# Patient Record
Sex: MALE | Race: WHITE | Employment: FULL TIME | URBAN - METROPOLITAN AREA
[De-identification: names, ages, dates, MRNs, and addresses within clinical notes are randomized per-mention and may not be internally consistent; named-entity substitution may affect disease eponyms.]

---

## 2019-08-28 ENCOUNTER — HOSPITAL ENCOUNTER (INPATIENT)
Age: 29
LOS: 3 days | Discharge: HOME | DRG: 638 | End: 2019-08-31
Attending: INTERNAL MEDICINE | Admitting: INTERNAL MEDICINE
Payer: MEDICAID

## 2019-08-28 ENCOUNTER — APPOINTMENT (OUTPATIENT)
Dept: GENERAL RADIOLOGY | Age: 29
End: 2019-08-28

## 2019-08-28 ENCOUNTER — HOSPITAL ENCOUNTER (OUTPATIENT)
Age: 29
Discharge: HOME | End: 2019-08-28

## 2019-08-28 ENCOUNTER — HOSPITAL ENCOUNTER (EMERGENCY)
Age: 29
Discharge: ANOTHER HOSPITAL | End: 2019-08-28
Attending: EMERGENCY MEDICINE | Admitting: INTERNAL MEDICINE

## 2019-08-28 VITALS
HEART RATE: 96 BPM | SYSTOLIC BLOOD PRESSURE: 121 MMHG | DIASTOLIC BLOOD PRESSURE: 66 MMHG | RESPIRATION RATE: 14 BRPM | BODY MASS INDEX: 18.3 KG/M2 | WEIGHT: 130.7 LBS | TEMPERATURE: 97.4 F | OXYGEN SATURATION: 100 % | HEIGHT: 71 IN

## 2019-08-28 DIAGNOSIS — E13.10 DIABETIC KETOACIDOSIS WITHOUT COMA ASSOCIATED WITH OTHER SPECIFIED DIABETES MELLITUS (HCC): Primary | ICD-10-CM

## 2019-08-28 DIAGNOSIS — E03.9 HYPOTHYROIDISM, UNSPECIFIED TYPE: ICD-10-CM

## 2019-08-28 PROBLEM — E87.5 HYPERKALEMIA: Status: ACTIVE | Noted: 2019-08-28

## 2019-08-28 PROBLEM — D72.829 LEUKOCYTOSIS: Status: ACTIVE | Noted: 2019-08-28

## 2019-08-28 PROBLEM — Z72.0 TOBACCO ABUSE: Status: ACTIVE | Noted: 2019-08-28

## 2019-08-28 PROBLEM — E10.10 DKA, TYPE 1, NOT AT GOAL (HCC): Status: ACTIVE | Noted: 2019-08-28

## 2019-08-28 PROBLEM — E03.8 SUBCLINICAL HYPOTHYROIDISM: Status: ACTIVE | Noted: 2019-08-28

## 2019-08-28 LAB
ALBUMIN SERPL-MCNC: 3.8 G/DL (ref 3.5–5.2)
ALP BLD-CCNC: 227 U/L (ref 40–129)
ALT SERPL-CCNC: 20 U/L (ref 0–40)
ANION GAP SERPL CALCULATED.3IONS-SCNC: 11 MMOL/L (ref 7–16)
ANION GAP SERPL CALCULATED.3IONS-SCNC: 19 MMOL/L (ref 7–16)
ANION GAP SERPL CALCULATED.3IONS-SCNC: 23 MMOL/L (ref 7–16)
ANION GAP SERPL CALCULATED.3IONS-SCNC: 24 MMOL/L (ref 7–16)
AST SERPL-CCNC: 16 U/L (ref 0–39)
BASOPHILS ABSOLUTE: 0 E9/L (ref 0–0.2)
BASOPHILS RELATIVE PERCENT: 0 % (ref 0–2)
BILIRUB SERPL-MCNC: 0.2 MG/DL (ref 0–1.2)
BILIRUBIN URINE: NEGATIVE
BLOOD, URINE: NEGATIVE
BUN BLDV-MCNC: 15 MG/DL (ref 6–20)
BUN BLDV-MCNC: 20 MG/DL (ref 6–20)
BUN BLDV-MCNC: 23 MG/DL (ref 6–20)
BUN BLDV-MCNC: 26 MG/DL (ref 6–20)
CALCIUM SERPL-MCNC: 10.1 MG/DL (ref 8.6–10.2)
CALCIUM SERPL-MCNC: 8.3 MG/DL (ref 8.6–10.2)
CALCIUM SERPL-MCNC: 8.8 MG/DL (ref 8.6–10.2)
CALCIUM SERPL-MCNC: 9.5 MG/DL (ref 8.6–10.2)
CHLORIDE BLD-SCNC: 72 MMOL/L (ref 98–107)
CHLORIDE BLD-SCNC: 84 MMOL/L (ref 98–107)
CHLORIDE BLD-SCNC: 91 MMOL/L (ref 98–107)
CHLORIDE BLD-SCNC: 97 MMOL/L (ref 98–107)
CLARITY: CLEAR
CO2: 20 MMOL/L (ref 22–29)
CO2: 21 MMOL/L (ref 22–29)
CO2: 22 MMOL/L (ref 22–29)
CO2: 26 MMOL/L (ref 22–29)
COLOR: YELLOW
CREAT SERPL-MCNC: 0.6 MG/DL (ref 0.7–1.2)
CREAT SERPL-MCNC: 0.7 MG/DL (ref 0.7–1.2)
CREAT SERPL-MCNC: 0.7 MG/DL (ref 0.7–1.2)
CREAT SERPL-MCNC: 0.8 MG/DL (ref 0.7–1.2)
EOSINOPHILS ABSOLUTE: 0.51 E9/L (ref 0.05–0.5)
EOSINOPHILS RELATIVE PERCENT: 4 % (ref 0–6)
GFR AFRICAN AMERICAN: >60
GFR NON-AFRICAN AMERICAN: >60 ML/MIN/1.73
GLUCOSE BLD-MCNC: 1109 MG/DL (ref 74–99)
GLUCOSE BLD-MCNC: 207 MG/DL (ref 74–99)
GLUCOSE BLD-MCNC: 370 MG/DL (ref 74–99)
GLUCOSE BLD-MCNC: 563 MG/DL (ref 74–99)
GLUCOSE URINE: >=1000 MG/DL
HCT VFR BLD CALC: 45.6 % (ref 37–54)
HEMOGLOBIN: 15.8 G/DL (ref 12.5–16.5)
KETONES, URINE: >=80 MG/DL
LACTIC ACID, SEPSIS: 1.3 MMOL/L (ref 0.5–1.9)
LACTIC ACID, SEPSIS: 1.5 MMOL/L (ref 0.5–1.9)
LEUKOCYTE ESTERASE, URINE: NEGATIVE
LIPASE: 29 U/L (ref 13–60)
LYMPHOCYTES ABSOLUTE: 1.41 E9/L (ref 1.5–4)
LYMPHOCYTES RELATIVE PERCENT: 11 % (ref 20–42)
MAGNESIUM: 1.7 MG/DL (ref 1.6–2.6)
MAGNESIUM: 1.9 MG/DL (ref 1.6–2.6)
MAGNESIUM: 2.1 MG/DL (ref 1.6–2.6)
MCH RBC QN AUTO: 32.8 PG (ref 26–35)
MCHC RBC AUTO-ENTMCNC: 34.6 % (ref 32–34.5)
MCV RBC AUTO: 94.8 FL (ref 80–99.9)
METER GLUCOSE: 212 MG/DL (ref 74–99)
METER GLUCOSE: 233 MG/DL (ref 74–99)
METER GLUCOSE: 266 MG/DL (ref 74–99)
METER GLUCOSE: 289 MG/DL (ref 74–99)
METER GLUCOSE: >500 MG/DL (ref 74–99)
METER GLUCOSE: >500 MG/DL (ref 74–99)
MONO TEST: NEGATIVE
MONOCYTES ABSOLUTE: 0.9 E9/L (ref 0.1–0.95)
MONOCYTES RELATIVE PERCENT: 7 % (ref 2–12)
NEUTROPHILS ABSOLUTE: 9.98 E9/L (ref 1.8–7.3)
NEUTROPHILS RELATIVE PERCENT: 78 % (ref 43–80)
NITRITE, URINE: NEGATIVE
PDW BLD-RTO: 12.4 FL (ref 11.5–15)
PH UA: 5 (ref 5–9)
PHOSPHORUS: 2.6 MG/DL (ref 2.5–4.5)
PHOSPHORUS: 2.6 MG/DL (ref 2.5–4.5)
PHOSPHORUS: 3.6 MG/DL (ref 2.5–4.5)
PLATELET # BLD: 312 E9/L (ref 130–450)
PMV BLD AUTO: 10.6 FL (ref 7–12)
POTASSIUM SERPL-SCNC: 3.5 MMOL/L (ref 3.5–5)
POTASSIUM SERPL-SCNC: 3.6 MMOL/L (ref 3.5–5)
POTASSIUM SERPL-SCNC: 4.9 MMOL/L (ref 3.5–5)
POTASSIUM SERPL-SCNC: 5.7 MMOL/L (ref 3.5–5)
PROTEIN UA: NEGATIVE MG/DL
RBC # BLD: 4.81 E12/L (ref 3.8–5.8)
RBC # BLD: NORMAL 10*6/UL
SODIUM BLD-SCNC: 116 MMOL/L (ref 132–146)
SODIUM BLD-SCNC: 128 MMOL/L (ref 132–146)
SODIUM BLD-SCNC: 132 MMOL/L (ref 132–146)
SODIUM BLD-SCNC: 134 MMOL/L (ref 132–146)
SPECIFIC GRAVITY UA: <=1.005 (ref 1–1.03)
T4 FREE: 0.67 NG/DL (ref 0.93–1.7)
TOTAL PROTEIN: 7.7 G/DL (ref 6.4–8.3)
TSH SERPL DL<=0.05 MIU/L-ACNC: 4.42 UIU/ML (ref 0.27–4.2)
UROBILINOGEN, URINE: 0.2 E.U./DL
WBC # BLD: 12.8 E9/L (ref 4.5–11.5)

## 2019-08-28 PROCEDURE — 2580000003 HC RX 258: Performed by: INTERNAL MEDICINE

## 2019-08-28 PROCEDURE — 82962 GLUCOSE BLOOD TEST: CPT

## 2019-08-28 PROCEDURE — 86308 HETEROPHILE ANTIBODY SCREEN: CPT

## 2019-08-28 PROCEDURE — 93005 ELECTROCARDIOGRAM TRACING: CPT

## 2019-08-28 PROCEDURE — 80053 COMPREHEN METABOLIC PANEL: CPT

## 2019-08-28 PROCEDURE — A0426 ALS 1: HCPCS

## 2019-08-28 PROCEDURE — 83735 ASSAY OF MAGNESIUM: CPT

## 2019-08-28 PROCEDURE — 81003 URINALYSIS AUTO W/O SCOPE: CPT

## 2019-08-28 PROCEDURE — 6360000002 HC RX W HCPCS: Performed by: INTERNAL MEDICINE

## 2019-08-28 PROCEDURE — 2000000000 HC ICU R&B

## 2019-08-28 PROCEDURE — 85025 COMPLETE CBC W/AUTO DIFF WBC: CPT

## 2019-08-28 PROCEDURE — 2580000003 HC RX 258: Performed by: EMERGENCY MEDICINE

## 2019-08-28 PROCEDURE — 87081 CULTURE SCREEN ONLY: CPT

## 2019-08-28 PROCEDURE — 2500000003 HC RX 250 WO HCPCS: Performed by: INTERNAL MEDICINE

## 2019-08-28 PROCEDURE — 80048 BASIC METABOLIC PNL TOTAL CA: CPT

## 2019-08-28 PROCEDURE — 99285 EMERGENCY DEPT VISIT HI MDM: CPT

## 2019-08-28 PROCEDURE — A0425 GROUND MILEAGE: HCPCS

## 2019-08-28 PROCEDURE — 84443 ASSAY THYROID STIM HORMONE: CPT

## 2019-08-28 PROCEDURE — 6370000000 HC RX 637 (ALT 250 FOR IP): Performed by: EMERGENCY MEDICINE

## 2019-08-28 PROCEDURE — 84100 ASSAY OF PHOSPHORUS: CPT

## 2019-08-28 PROCEDURE — 36415 COLL VENOUS BLD VENIPUNCTURE: CPT

## 2019-08-28 PROCEDURE — 84439 ASSAY OF FREE THYROXINE: CPT

## 2019-08-28 PROCEDURE — 71046 X-RAY EXAM CHEST 2 VIEWS: CPT

## 2019-08-28 PROCEDURE — 83605 ASSAY OF LACTIC ACID: CPT

## 2019-08-28 PROCEDURE — 1200000000 HC SEMI PRIVATE

## 2019-08-28 PROCEDURE — 83690 ASSAY OF LIPASE: CPT

## 2019-08-28 PROCEDURE — 6370000000 HC RX 637 (ALT 250 FOR IP): Performed by: INTERNAL MEDICINE

## 2019-08-28 RX ORDER — 0.9 % SODIUM CHLORIDE 0.9 %
15 INTRAVENOUS SOLUTION INTRAVENOUS ONCE
Status: CANCELLED | OUTPATIENT
Start: 2019-08-28 | End: 2019-08-28

## 2019-08-28 RX ORDER — 0.9 % SODIUM CHLORIDE 0.9 %
15 INTRAVENOUS SOLUTION INTRAVENOUS ONCE
Status: COMPLETED | OUTPATIENT
Start: 2019-08-28 | End: 2019-08-28

## 2019-08-28 RX ORDER — SODIUM CHLORIDE 9 MG/ML
INJECTION, SOLUTION INTRAVENOUS CONTINUOUS
Status: DISCONTINUED | OUTPATIENT
Start: 2019-08-28 | End: 2019-08-28 | Stop reason: HOSPADM

## 2019-08-28 RX ORDER — DEXTROSE MONOHYDRATE 25 G/50ML
12.5 INJECTION, SOLUTION INTRAVENOUS PRN
Status: CANCELLED | OUTPATIENT
Start: 2019-08-28

## 2019-08-28 RX ORDER — SODIUM CHLORIDE 9 MG/ML
INJECTION, SOLUTION INTRAVENOUS CONTINUOUS
Status: CANCELLED | OUTPATIENT
Start: 2019-08-28

## 2019-08-28 RX ORDER — POTASSIUM CHLORIDE 7.45 MG/ML
10 INJECTION INTRAVENOUS PRN
Status: CANCELLED | OUTPATIENT
Start: 2019-08-28

## 2019-08-28 RX ORDER — DEXTROSE, SODIUM CHLORIDE, AND POTASSIUM CHLORIDE 5; .45; .15 G/100ML; G/100ML; G/100ML
INJECTION INTRAVENOUS CONTINUOUS PRN
Status: DISCONTINUED | OUTPATIENT
Start: 2019-08-28 | End: 2019-08-29

## 2019-08-28 RX ORDER — ONDANSETRON 2 MG/ML
4 INJECTION INTRAMUSCULAR; INTRAVENOUS EVERY 6 HOURS PRN
Status: DISCONTINUED | OUTPATIENT
Start: 2019-08-28 | End: 2019-08-31 | Stop reason: HOSPADM

## 2019-08-28 RX ORDER — ACETAMINOPHEN 325 MG/1
650 TABLET ORAL EVERY 4 HOURS PRN
Status: DISCONTINUED | OUTPATIENT
Start: 2019-08-28 | End: 2019-08-31 | Stop reason: HOSPADM

## 2019-08-28 RX ORDER — SODIUM CHLORIDE 9 MG/ML
INJECTION, SOLUTION INTRAVENOUS CONTINUOUS
Status: DISCONTINUED | OUTPATIENT
Start: 2019-08-28 | End: 2019-08-29

## 2019-08-28 RX ORDER — DEXTROSE MONOHYDRATE 25 G/50ML
12.5 INJECTION, SOLUTION INTRAVENOUS PRN
Status: DISCONTINUED | OUTPATIENT
Start: 2019-08-28 | End: 2019-08-28 | Stop reason: HOSPADM

## 2019-08-28 RX ORDER — DEXTROSE, SODIUM CHLORIDE, AND POTASSIUM CHLORIDE 5; .45; .15 G/100ML; G/100ML; G/100ML
INJECTION INTRAVENOUS CONTINUOUS PRN
Status: DISCONTINUED | OUTPATIENT
Start: 2019-08-28 | End: 2019-08-28 | Stop reason: HOSPADM

## 2019-08-28 RX ORDER — MAGNESIUM SULFATE 1 G/100ML
1 INJECTION INTRAVENOUS PRN
Status: CANCELLED | OUTPATIENT
Start: 2019-08-28

## 2019-08-28 RX ORDER — DEXTROSE MONOHYDRATE 25 G/50ML
12.5 INJECTION, SOLUTION INTRAVENOUS PRN
Status: DISCONTINUED | OUTPATIENT
Start: 2019-08-28 | End: 2019-08-31 | Stop reason: HOSPADM

## 2019-08-28 RX ORDER — DEXTROSE AND SODIUM CHLORIDE 5; .45 G/100ML; G/100ML
INJECTION, SOLUTION INTRAVENOUS CONTINUOUS PRN
Status: CANCELLED | OUTPATIENT
Start: 2019-08-28

## 2019-08-28 RX ORDER — POTASSIUM CHLORIDE 7.45 MG/ML
10 INJECTION INTRAVENOUS PRN
Status: DISCONTINUED | OUTPATIENT
Start: 2019-08-28 | End: 2019-08-28 | Stop reason: HOSPADM

## 2019-08-28 RX ORDER — POTASSIUM CHLORIDE 7.45 MG/ML
10 INJECTION INTRAVENOUS PRN
Status: DISCONTINUED | OUTPATIENT
Start: 2019-08-28 | End: 2019-08-29

## 2019-08-28 RX ORDER — MAGNESIUM SULFATE 1 G/100ML
1 INJECTION INTRAVENOUS PRN
Status: DISCONTINUED | OUTPATIENT
Start: 2019-08-28 | End: 2019-08-29

## 2019-08-28 RX ORDER — MAGNESIUM SULFATE 1 G/100ML
1 INJECTION INTRAVENOUS PRN
Status: DISCONTINUED | OUTPATIENT
Start: 2019-08-28 | End: 2019-08-28 | Stop reason: HOSPADM

## 2019-08-28 RX ADMIN — SODIUM CHLORIDE 0.05 UNITS/KG/HR: 9 INJECTION, SOLUTION INTRAVENOUS at 17:11

## 2019-08-28 RX ADMIN — SODIUM CHLORIDE: 9 INJECTION, SOLUTION INTRAVENOUS at 18:48

## 2019-08-28 RX ADMIN — POTASSIUM CHLORIDE 10 MEQ: 10 INJECTION, SOLUTION INTRAVENOUS at 19:20

## 2019-08-28 RX ADMIN — POTASSIUM CHLORIDE 10 MEQ: 10 INJECTION, SOLUTION INTRAVENOUS at 20:32

## 2019-08-28 RX ADMIN — POTASSIUM CHLORIDE 10 MEQ: 10 INJECTION, SOLUTION INTRAVENOUS at 21:35

## 2019-08-28 RX ADMIN — SODIUM CHLORIDE: 9 INJECTION, SOLUTION INTRAVENOUS at 18:18

## 2019-08-28 RX ADMIN — POTASSIUM CHLORIDE, DEXTROSE MONOHYDRATE AND SODIUM CHLORIDE: 150; 5; 450 INJECTION, SOLUTION INTRAVENOUS at 21:47

## 2019-08-28 RX ADMIN — ACETAMINOPHEN 650 MG: 325 TABLET ORAL at 20:39

## 2019-08-28 RX ADMIN — SODIUM CHLORIDE 0.1 UNITS/KG/HR: 9 INJECTION, SOLUTION INTRAVENOUS at 14:40

## 2019-08-28 RX ADMIN — SODIUM PHOSPHATE, MONOBASIC, MONOHYDRATE 10 MMOL: 276; 142 INJECTION, SOLUTION INTRAVENOUS at 20:42

## 2019-08-28 RX ADMIN — SODIUM CHLORIDE: 9 INJECTION, SOLUTION INTRAVENOUS at 14:43

## 2019-08-28 RX ADMIN — SODIUM CHLORIDE 890 ML: 9 INJECTION, SOLUTION INTRAVENOUS at 14:44

## 2019-08-28 ASSESSMENT — PAIN DESCRIPTION - LOCATION
LOCATION: MOUTH
LOCATION: MOUTH

## 2019-08-28 ASSESSMENT — PAIN SCALES - GENERAL
PAINLEVEL_OUTOF10: 0
PAINLEVEL_OUTOF10: 3
PAINLEVEL_OUTOF10: 3
PAINLEVEL_OUTOF10: 0

## 2019-08-28 ASSESSMENT — PAIN SCALES - WONG BAKER: WONGBAKER_NUMERICALRESPONSE: 8

## 2019-08-28 ASSESSMENT — PAIN DESCRIPTION - PAIN TYPE
TYPE: CHRONIC PAIN
TYPE: ACUTE PAIN

## 2019-08-28 NOTE — ED PROVIDER NOTES
meningeal signs  Respiratory: Lungs clear to auscultation bilaterally, no wheezes, rales, or rhonchi. Not in respiratory distress  Cardiovascular:  Tachycardic but Regular rhythm. No murmurs, no aortic murmurs, no gallops, or rubs. 2+ distal pulses. Equal extremity pulses. Chest: No chest wall tenderness  GI:  Abdomen Soft, Non tender, Non distended. +BS. No rebound, guarding, or rigidity. No pulsatile masses. Musculoskeletal: Moves all extremities x 4. Warm and well perfused, no clubbing, cyanosis, or edema. Capillary refill <3 seconds  Integument: skin warm and dry. No rashes. Neurologic: GCS 15, no focal deficits,         -------------------------------------------------- RESULTS -------------------------------------------------  I have personally reviewed all laboratory and imaging results for this patient. Results are listed below.      LABS:  Results for orders placed or performed during the hospital encounter of 08/28/19   CBC Auto Differential   Result Value Ref Range    WBC 12.8 (H) 4.5 - 11.5 E9/L    RBC 4.81 3.80 - 5.80 E12/L    Hemoglobin 15.8 12.5 - 16.5 g/dL    Hematocrit 45.6 37.0 - 54.0 %    MCV 94.8 80.0 - 99.9 fL    MCH 32.8 26.0 - 35.0 pg    MCHC 34.6 (H) 32.0 - 34.5 %    RDW 12.4 11.5 - 15.0 fL    Platelets 761 880 - 733 E9/L    MPV 10.6 7.0 - 12.0 fL    Neutrophils % 78.0 43.0 - 80.0 %    Lymphocytes % 11.0 (L) 20.0 - 42.0 %    Monocytes % 7.0 2.0 - 12.0 %    Eosinophils % 4.0 0.0 - 6.0 %    Basophils % 0.0 0.0 - 2.0 %    Neutrophils Absolute 9.98 (H) 1.80 - 7.30 E9/L    Lymphocytes Absolute 1.41 (L) 1.50 - 4.00 E9/L    Monocytes Absolute 0.90 0.10 - 0.95 E9/L    Eosinophils Absolute 0.51 (H) 0.05 - 0.50 E9/L    Basophils Absolute 0.00 0.00 - 0.20 E9/L    RBC Morphology Normal    Comprehensive Metabolic Panel   Result Value Ref Range    Sodium 116 (LL) 132 - 146 mmol/L    Potassium 5.7 (H) 3.5 - 5.0 mmol/L    Chloride 72 (LL) 98 - 107 mmol/L    CO2 20 (L) 22 - 29 mmol/L    Anion Gap 24

## 2019-08-29 LAB
AMORPHOUS: ABNORMAL
AMPHETAMINE SCREEN, URINE: NOT DETECTED
ANION GAP SERPL CALCULATED.3IONS-SCNC: 9 MMOL/L (ref 7–16)
ANION GAP SERPL CALCULATED.3IONS-SCNC: 9 MMOL/L (ref 7–16)
BACTERIA: ABNORMAL /HPF
BARBITURATE SCREEN URINE: NOT DETECTED
BENZODIAZEPINE SCREEN, URINE: NOT DETECTED
BILIRUBIN URINE: ABNORMAL
BLOOD, URINE: NEGATIVE
BUN BLDV-MCNC: 12 MG/DL (ref 6–20)
BUN BLDV-MCNC: 13 MG/DL (ref 6–20)
CALCIUM SERPL-MCNC: 7.8 MG/DL (ref 8.6–10.2)
CALCIUM SERPL-MCNC: 8.4 MG/DL (ref 8.6–10.2)
CANNABINOID SCREEN URINE: NOT DETECTED
CASTS: ABNORMAL /LPF
CHLORIDE BLD-SCNC: 101 MMOL/L (ref 98–107)
CHLORIDE BLD-SCNC: 97 MMOL/L (ref 98–107)
CLARITY: CLEAR
CO2: 24 MMOL/L (ref 22–29)
CO2: 27 MMOL/L (ref 22–29)
COCAINE METABOLITE SCREEN URINE: NOT DETECTED
COLOR: YELLOW
CREAT SERPL-MCNC: 0.6 MG/DL (ref 0.7–1.2)
CREAT SERPL-MCNC: 0.6 MG/DL (ref 0.7–1.2)
CRYSTALS, UA: ABNORMAL
EPITHELIAL CELLS, UA: ABNORMAL /HPF
GFR AFRICAN AMERICAN: >60
GFR AFRICAN AMERICAN: >60
GFR NON-AFRICAN AMERICAN: >60 ML/MIN/1.73
GFR NON-AFRICAN AMERICAN: >60 ML/MIN/1.73
GLUCOSE BLD-MCNC: 128 MG/DL (ref 74–99)
GLUCOSE BLD-MCNC: 194 MG/DL (ref 74–99)
GLUCOSE URINE: 500 MG/DL
HCT VFR BLD CALC: 38 % (ref 37–54)
HEMOGLOBIN: 13.3 G/DL (ref 12.5–16.5)
KETONES, URINE: 40 MG/DL
LEUKOCYTE ESTERASE, URINE: NEGATIVE
Lab: NORMAL
MAGNESIUM: 1.6 MG/DL (ref 1.6–2.6)
MAGNESIUM: 1.7 MG/DL (ref 1.6–2.6)
MCH RBC QN AUTO: 32.4 PG (ref 26–35)
MCHC RBC AUTO-ENTMCNC: 35 % (ref 32–34.5)
MCV RBC AUTO: 92.5 FL (ref 80–99.9)
METER GLUCOSE: 125 MG/DL (ref 74–99)
METER GLUCOSE: 137 MG/DL (ref 74–99)
METER GLUCOSE: 137 MG/DL (ref 74–99)
METER GLUCOSE: 139 MG/DL (ref 74–99)
METER GLUCOSE: 152 MG/DL (ref 74–99)
METER GLUCOSE: 171 MG/DL (ref 74–99)
METER GLUCOSE: 184 MG/DL (ref 74–99)
METER GLUCOSE: 198 MG/DL (ref 74–99)
METER GLUCOSE: 199 MG/DL (ref 74–99)
METER GLUCOSE: 205 MG/DL (ref 74–99)
METER GLUCOSE: 213 MG/DL (ref 74–99)
METER GLUCOSE: 315 MG/DL (ref 74–99)
METER GLUCOSE: 352 MG/DL (ref 74–99)
METHADONE SCREEN, URINE: NOT DETECTED
NITRITE, URINE: NEGATIVE
OPIATE SCREEN URINE: NOT DETECTED
PDW BLD-RTO: 12.5 FL (ref 11.5–15)
PH UA: 5.5 (ref 5–9)
PHENCYCLIDINE SCREEN URINE: NOT DETECTED
PHOSPHORUS: 2.6 MG/DL (ref 2.5–4.5)
PHOSPHORUS: 2.9 MG/DL (ref 2.5–4.5)
PLATELET # BLD: 223 E9/L (ref 130–450)
PMV BLD AUTO: 10 FL (ref 7–12)
POTASSIUM SERPL-SCNC: 3.8 MMOL/L (ref 3.5–5)
POTASSIUM SERPL-SCNC: 4.5 MMOL/L (ref 3.5–5)
PROPOXYPHENE SCREEN: NOT DETECTED
PROTEIN UA: 30 MG/DL
RBC # BLD: 4.11 E12/L (ref 3.8–5.8)
RBC UA: ABNORMAL /HPF (ref 0–2)
SODIUM BLD-SCNC: 133 MMOL/L (ref 132–146)
SODIUM BLD-SCNC: 134 MMOL/L (ref 132–146)
SPECIFIC GRAVITY UA: >=1.03 (ref 1–1.03)
UROBILINOGEN, URINE: 0.2 E.U./DL
WBC # BLD: 10.6 E9/L (ref 4.5–11.5)
WBC UA: ABNORMAL /HPF (ref 0–5)

## 2019-08-29 PROCEDURE — 80307 DRUG TEST PRSMV CHEM ANLYZR: CPT

## 2019-08-29 PROCEDURE — 84681 ASSAY OF C-PEPTIDE: CPT

## 2019-08-29 PROCEDURE — 87088 URINE BACTERIA CULTURE: CPT

## 2019-08-29 PROCEDURE — 2500000003 HC RX 250 WO HCPCS: Performed by: INTERNAL MEDICINE

## 2019-08-29 PROCEDURE — 80048 BASIC METABOLIC PNL TOTAL CA: CPT

## 2019-08-29 PROCEDURE — 82962 GLUCOSE BLOOD TEST: CPT

## 2019-08-29 PROCEDURE — 2580000003 HC RX 258: Performed by: INTERNAL MEDICINE

## 2019-08-29 PROCEDURE — 6370000000 HC RX 637 (ALT 250 FOR IP): Performed by: INTERNAL MEDICINE

## 2019-08-29 PROCEDURE — 83735 ASSAY OF MAGNESIUM: CPT

## 2019-08-29 PROCEDURE — 81001 URINALYSIS AUTO W/SCOPE: CPT

## 2019-08-29 PROCEDURE — 6370000000 HC RX 637 (ALT 250 FOR IP): Performed by: STUDENT IN AN ORGANIZED HEALTH CARE EDUCATION/TRAINING PROGRAM

## 2019-08-29 PROCEDURE — 6360000002 HC RX W HCPCS: Performed by: INTERNAL MEDICINE

## 2019-08-29 PROCEDURE — 84100 ASSAY OF PHOSPHORUS: CPT

## 2019-08-29 PROCEDURE — 36415 COLL VENOUS BLD VENIPUNCTURE: CPT

## 2019-08-29 PROCEDURE — 99253 IP/OBS CNSLTJ NEW/EST LOW 45: CPT | Performed by: INTERNAL MEDICINE

## 2019-08-29 PROCEDURE — 2000000000 HC ICU R&B

## 2019-08-29 PROCEDURE — 6370000000 HC RX 637 (ALT 250 FOR IP)

## 2019-08-29 PROCEDURE — 85027 COMPLETE CBC AUTOMATED: CPT

## 2019-08-29 RX ORDER — INSULIN GLARGINE 100 [IU]/ML
10 INJECTION, SOLUTION SUBCUTANEOUS NIGHTLY
Status: DISCONTINUED | OUTPATIENT
Start: 2019-08-29 | End: 2019-08-29

## 2019-08-29 RX ORDER — IBUPROFEN 400 MG/1
TABLET ORAL
Status: COMPLETED
Start: 2019-08-29 | End: 2019-08-29

## 2019-08-29 RX ORDER — NICOTINE 21 MG/24HR
1 PATCH, TRANSDERMAL 24 HOURS TRANSDERMAL DAILY
Status: DISCONTINUED | OUTPATIENT
Start: 2019-08-29 | End: 2019-08-31 | Stop reason: HOSPADM

## 2019-08-29 RX ORDER — DEXTROSE MONOHYDRATE 25 G/50ML
12.5 INJECTION, SOLUTION INTRAVENOUS PRN
Status: DISCONTINUED | OUTPATIENT
Start: 2019-08-29 | End: 2019-08-31 | Stop reason: HOSPADM

## 2019-08-29 RX ORDER — INSULIN GLARGINE 100 [IU]/ML
12 INJECTION, SOLUTION SUBCUTANEOUS NIGHTLY
Status: DISCONTINUED | OUTPATIENT
Start: 2019-08-29 | End: 2019-08-29

## 2019-08-29 RX ORDER — SODIUM CHLORIDE 9 MG/ML
INJECTION, SOLUTION INTRAVENOUS CONTINUOUS
Status: DISCONTINUED | OUTPATIENT
Start: 2019-08-29 | End: 2019-08-29

## 2019-08-29 RX ORDER — IBUPROFEN 400 MG/1
400 TABLET ORAL EVERY 6 HOURS PRN
Status: DISCONTINUED | OUTPATIENT
Start: 2019-08-29 | End: 2019-08-31

## 2019-08-29 RX ORDER — NICOTINE POLACRILEX 4 MG
15 LOZENGE BUCCAL PRN
Status: DISCONTINUED | OUTPATIENT
Start: 2019-08-29 | End: 2019-08-31 | Stop reason: HOSPADM

## 2019-08-29 RX ORDER — INSULIN GLARGINE 100 [IU]/ML
3 INJECTION, SOLUTION SUBCUTANEOUS ONCE
Status: COMPLETED | OUTPATIENT
Start: 2019-08-29 | End: 2019-08-29

## 2019-08-29 RX ORDER — DEXTROSE MONOHYDRATE 50 MG/ML
100 INJECTION, SOLUTION INTRAVENOUS PRN
Status: DISCONTINUED | OUTPATIENT
Start: 2019-08-29 | End: 2019-08-31 | Stop reason: HOSPADM

## 2019-08-29 RX ORDER — IBUPROFEN 400 MG/1
400 TABLET ORAL ONCE
Status: DISCONTINUED | OUTPATIENT
Start: 2019-08-29 | End: 2019-08-30

## 2019-08-29 RX ORDER — INSULIN GLARGINE 100 [IU]/ML
15 INJECTION, SOLUTION SUBCUTANEOUS NIGHTLY
Status: DISCONTINUED | OUTPATIENT
Start: 2019-08-30 | End: 2019-08-30

## 2019-08-29 RX ADMIN — INSULIN GLARGINE 12 UNITS: 100 INJECTION, SOLUTION SUBCUTANEOUS at 21:39

## 2019-08-29 RX ADMIN — SODIUM CHLORIDE: 9 INJECTION, SOLUTION INTRAVENOUS at 11:49

## 2019-08-29 RX ADMIN — INSULIN LISPRO 2 UNITS: 100 INJECTION, SOLUTION INTRAVENOUS; SUBCUTANEOUS at 16:53

## 2019-08-29 RX ADMIN — ACETAMINOPHEN 650 MG: 325 TABLET ORAL at 14:46

## 2019-08-29 RX ADMIN — POTASSIUM CHLORIDE 10 MEQ: 10 INJECTION, SOLUTION INTRAVENOUS at 02:44

## 2019-08-29 RX ADMIN — POTASSIUM CHLORIDE 10 MEQ: 10 INJECTION, SOLUTION INTRAVENOUS at 04:03

## 2019-08-29 RX ADMIN — POTASSIUM CHLORIDE 10 MEQ: 10 INJECTION, SOLUTION INTRAVENOUS at 00:38

## 2019-08-29 RX ADMIN — IBUPROFEN 400 MG: 400 TABLET, FILM COATED ORAL at 14:46

## 2019-08-29 RX ADMIN — INSULIN LISPRO 3 UNITS: 100 INJECTION, SOLUTION INTRAVENOUS; SUBCUTANEOUS at 16:53

## 2019-08-29 RX ADMIN — POTASSIUM CHLORIDE 10 MEQ: 10 INJECTION, SOLUTION INTRAVENOUS at 01:40

## 2019-08-29 RX ADMIN — SODIUM PHOSPHATE, MONOBASIC, MONOHYDRATE 10 MMOL: 276; 142 INJECTION, SOLUTION INTRAVENOUS at 03:58

## 2019-08-29 RX ADMIN — INSULIN LISPRO 5 UNITS: 100 INJECTION, SOLUTION INTRAVENOUS; SUBCUTANEOUS at 16:50

## 2019-08-29 RX ADMIN — INSULIN HUMAN 10 UNITS: 100 INJECTION, SUSPENSION SUBCUTANEOUS at 10:07

## 2019-08-29 RX ADMIN — ACETAMINOPHEN 650 MG: 325 TABLET ORAL at 20:15

## 2019-08-29 RX ADMIN — POTASSIUM CHLORIDE 10 MEQ: 10 INJECTION, SOLUTION INTRAVENOUS at 05:05

## 2019-08-29 RX ADMIN — IBUPROFEN 400 MG: 400 TABLET ORAL at 20:16

## 2019-08-29 RX ADMIN — INSULIN LISPRO 4 UNITS: 100 INJECTION, SOLUTION INTRAVENOUS; SUBCUTANEOUS at 21:37

## 2019-08-29 RX ADMIN — POTASSIUM CHLORIDE 10 MEQ: 10 INJECTION, SOLUTION INTRAVENOUS at 06:07

## 2019-08-29 RX ADMIN — INSULIN GLARGINE 3 UNITS: 100 INJECTION, SOLUTION SUBCUTANEOUS at 22:13

## 2019-08-29 RX ADMIN — POTASSIUM CHLORIDE, DEXTROSE MONOHYDRATE AND SODIUM CHLORIDE: 150; 5; 450 INJECTION, SOLUTION INTRAVENOUS at 04:23

## 2019-08-29 ASSESSMENT — PAIN SCALES - GENERAL
PAINLEVEL_OUTOF10: 3
PAINLEVEL_OUTOF10: 0
PAINLEVEL_OUTOF10: 4
PAINLEVEL_OUTOF10: 8
PAINLEVEL_OUTOF10: 0
PAINLEVEL_OUTOF10: 3

## 2019-08-29 ASSESSMENT — PAIN DESCRIPTION - DESCRIPTORS: DESCRIPTORS: ACHING;DISCOMFORT;SHOOTING

## 2019-08-29 ASSESSMENT — PAIN DESCRIPTION - PAIN TYPE
TYPE: CHRONIC PAIN
TYPE: CHRONIC PAIN

## 2019-08-29 ASSESSMENT — PAIN DESCRIPTION - LOCATION
LOCATION: MOUTH
LOCATION: MOUTH

## 2019-08-29 NOTE — CARE COORDINATION
Per HELP, HCAP eligible and eligible for help with meds upon discharge.  medicaid application initiated Jj Fernandez

## 2019-08-29 NOTE — PLAN OF CARE
Problem: Discharge Planning:  Goal: Discharged to appropriate level of care  Description  Discharged to appropriate level of care  8/29/2019 0127 by Courtney Gaxiola RN  Outcome: Met This Shift  8/28/2019 1822 by Albertina Spencer RN  Outcome: Met This Shift  Goal: Participates in care planning  Description  Participates in care planning  8/29/2019 0127 by Courtney Gaxiola RN  Outcome: Met This Shift  8/28/2019 1822 by Albertina Spencer RN  Outcome: Met This Shift     Problem: Serum Glucose Level - Abnormal:  Goal: Ability to maintain appropriate glucose levels will improve  Description  Ability to maintain appropriate glucose levels will improve  8/29/2019 0127 by Courtney Gaxiola RN  Outcome: Met This Shift  8/28/2019 1822 by Albertina Spencer RN  Outcome: Met This Shift     Problem: Anxiety/Stress:  Goal: Level of anxiety will decrease  Description  Level of anxiety will decrease  8/29/2019 0127 by Courtney Gaxiola RN  Outcome: Met This Shift  8/28/2019 1822 by Albertina Spencer RN  Outcome: Met This Shift     Problem: Pain:  Goal: Pain level will decrease  Description  Pain level will decrease  Outcome: Met This Shift  Goal: Control of acute pain  Description  Control of acute pain  Outcome: Met This Shift  Goal: Control of chronic pain  Description  Control of chronic pain  Outcome: Met This Shift

## 2019-08-29 NOTE — CONSULTS
APTT in the last 72 hours. Lactic Acid  No results found for: LACTA     BNP   No results for input(s): BNP in the last 72 hours. Cultures   No results for input(s): BC in the last 72 hours. No results for input(s): Alma Alvine in the last 72 hours. No results for input(s): LABURIN in the last 72 hours. Radiology   8/29/2019  No orders to display         SYSTEMS ASSESSMENT    Neuro   Alert, no focal deficits  No signs of cerebral edema  Mild blurry vision  - no papillary edema or hemorrhages  - Will likely need ophtho follow up  - Monitor for s/s edema    Respiratory   No acute complaints    Cardiovascular   Hemodynamically stable    Gastrointestinal   Abdominal pain free  No nausea/vomiting  Tolerating diet    Renal   Anion gap metabolic acidosis  - 2/2 DKA  - Improved/resolved gap and acidosis  - Improving ketonuria     Infectious Disease   No acute infectious process identified    Hematology/Oncology   No acute concerns    Endocrine   New onset DM  Diabetic ketoacidosis  Hypothyroidism  - Endocrine on board - appreciate reccs  - Off insulin drip, switched to sq insulin    Social/Spiritual/DNR/Other   Full code    ______________________________________________________________________    ASSESSMENT/ PLAN   1. As above  2. PO diet - carb control  3. Diabetic education  4. SQ insulin per endocrine recommendations  5. Follow cultures  6. GI prophylaxis  7. DVT Prophylaxis  8.  Discuss case and plan with attending, Dr. Esteban Chacko DO  Resident, PGY-4  8/29/2019  2:26 PM

## 2019-08-29 NOTE — PROGRESS NOTES
Discussed diabetes educator information with patient. Patient requesting to receive diabetes education in [facility] upon discharge. Form faxed to diabetes educator. Diabetes education packet provided to patient. Went over all forms in packet with patient and family member at bedside. Educated patient on recommended diet and need for medication/diet/activity compliance while hospitalized and upon discharge. Patient verbalizes understanding and willingness to learn. Patient denies any additional questions at this time.

## 2019-08-30 PROBLEM — E44.0 MODERATE PROTEIN-CALORIE MALNUTRITION (HCC): Status: ACTIVE | Noted: 2019-08-30

## 2019-08-30 LAB
ALBUMIN SERPL-MCNC: 3.3 G/DL (ref 3.5–5.2)
ALP BLD-CCNC: 94 U/L (ref 40–129)
ALT SERPL-CCNC: 21 U/L (ref 0–40)
ANION GAP SERPL CALCULATED.3IONS-SCNC: 11 MMOL/L (ref 7–16)
AST SERPL-CCNC: 25 U/L (ref 0–39)
BILIRUB SERPL-MCNC: <0.2 MG/DL (ref 0–1.2)
BILIRUBIN DIRECT: <0.2 MG/DL (ref 0–0.3)
BILIRUBIN, INDIRECT: ABNORMAL MG/DL (ref 0–1)
BUN BLDV-MCNC: 8 MG/DL (ref 6–20)
CALCIUM SERPL-MCNC: 8.4 MG/DL (ref 8.6–10.2)
CHLORIDE BLD-SCNC: 103 MMOL/L (ref 98–107)
CO2: 25 MMOL/L (ref 22–29)
CORTISOL TOTAL: 16.97 MCG/DL (ref 2.68–18.4)
CREAT SERPL-MCNC: 0.6 MG/DL (ref 0.7–1.2)
GFR AFRICAN AMERICAN: >60
GFR NON-AFRICAN AMERICAN: >60 ML/MIN/1.73
GLUCOSE BLD-MCNC: 73 MG/DL (ref 74–99)
HCT VFR BLD CALC: 41.8 % (ref 37–54)
HEMOGLOBIN: 14.4 G/DL (ref 12.5–16.5)
MAGNESIUM: 2 MG/DL (ref 1.6–2.6)
MCH RBC QN AUTO: 32.4 PG (ref 26–35)
MCHC RBC AUTO-ENTMCNC: 34.4 % (ref 32–34.5)
MCV RBC AUTO: 94.1 FL (ref 80–99.9)
METER GLUCOSE: 102 MG/DL (ref 74–99)
METER GLUCOSE: 237 MG/DL (ref 74–99)
METER GLUCOSE: 272 MG/DL (ref 74–99)
METER GLUCOSE: 290 MG/DL (ref 74–99)
METER GLUCOSE: 316 MG/DL (ref 74–99)
METER GLUCOSE: 68 MG/DL (ref 74–99)
MRSA CULTURE ONLY: NORMAL
PDW BLD-RTO: 12.7 FL (ref 11.5–15)
PHOSPHORUS: 3.3 MG/DL (ref 2.5–4.5)
PLATELET # BLD: 252 E9/L (ref 130–450)
PMV BLD AUTO: 10.1 FL (ref 7–12)
POTASSIUM SERPL-SCNC: 3.2 MMOL/L (ref 3.5–5)
RBC # BLD: 4.44 E12/L (ref 3.8–5.8)
SODIUM BLD-SCNC: 139 MMOL/L (ref 132–146)
T4 FREE: 0.87 NG/DL (ref 0.93–1.7)
T4 TOTAL: 4.6 MCG/DL (ref 4.5–11.7)
TOTAL PROTEIN: 5.8 G/DL (ref 6.4–8.3)
TSH SERPL DL<=0.05 MIU/L-ACNC: 9.69 UIU/ML (ref 0.27–4.2)
WBC # BLD: 9.6 E9/L (ref 4.5–11.5)

## 2019-08-30 PROCEDURE — 84439 ASSAY OF FREE THYROXINE: CPT

## 2019-08-30 PROCEDURE — 36415 COLL VENOUS BLD VENIPUNCTURE: CPT

## 2019-08-30 PROCEDURE — 80048 BASIC METABOLIC PNL TOTAL CA: CPT

## 2019-08-30 PROCEDURE — 83735 ASSAY OF MAGNESIUM: CPT

## 2019-08-30 PROCEDURE — 85027 COMPLETE CBC AUTOMATED: CPT

## 2019-08-30 PROCEDURE — 83036 HEMOGLOBIN GLYCOSYLATED A1C: CPT

## 2019-08-30 PROCEDURE — 83516 IMMUNOASSAY NONANTIBODY: CPT

## 2019-08-30 PROCEDURE — 80076 HEPATIC FUNCTION PANEL: CPT

## 2019-08-30 PROCEDURE — 6370000000 HC RX 637 (ALT 250 FOR IP): Performed by: STUDENT IN AN ORGANIZED HEALTH CARE EDUCATION/TRAINING PROGRAM

## 2019-08-30 PROCEDURE — 6370000000 HC RX 637 (ALT 250 FOR IP): Performed by: INTERNAL MEDICINE

## 2019-08-30 PROCEDURE — 1200000000 HC SEMI PRIVATE

## 2019-08-30 PROCEDURE — 82533 TOTAL CORTISOL: CPT

## 2019-08-30 PROCEDURE — 84443 ASSAY THYROID STIM HORMONE: CPT

## 2019-08-30 PROCEDURE — 99232 SBSQ HOSP IP/OBS MODERATE 35: CPT | Performed by: INTERNAL MEDICINE

## 2019-08-30 PROCEDURE — 82962 GLUCOSE BLOOD TEST: CPT

## 2019-08-30 PROCEDURE — 84100 ASSAY OF PHOSPHORUS: CPT

## 2019-08-30 RX ORDER — INSULIN GLARGINE 100 [IU]/ML
12 INJECTION, SOLUTION SUBCUTANEOUS NIGHTLY
Status: DISCONTINUED | OUTPATIENT
Start: 2019-08-30 | End: 2019-08-31 | Stop reason: HOSPADM

## 2019-08-30 RX ORDER — POTASSIUM CHLORIDE 20 MEQ/1
40 TABLET, EXTENDED RELEASE ORAL ONCE
Status: COMPLETED | OUTPATIENT
Start: 2019-08-30 | End: 2019-08-30

## 2019-08-30 RX ADMIN — INSULIN LISPRO 2 UNITS: 100 INJECTION, SOLUTION INTRAVENOUS; SUBCUTANEOUS at 07:55

## 2019-08-30 RX ADMIN — POTASSIUM CHLORIDE 40 MEQ: 20 TABLET, EXTENDED RELEASE ORAL at 11:45

## 2019-08-30 RX ADMIN — INSULIN LISPRO 4 UNITS: 100 INJECTION, SOLUTION INTRAVENOUS; SUBCUTANEOUS at 12:53

## 2019-08-30 RX ADMIN — IBUPROFEN 400 MG: 400 TABLET ORAL at 15:07

## 2019-08-30 RX ADMIN — INSULIN LISPRO 3 UNITS: 100 INJECTION, SOLUTION INTRAVENOUS; SUBCUTANEOUS at 12:50

## 2019-08-30 RX ADMIN — IBUPROFEN 400 MG: 400 TABLET ORAL at 07:02

## 2019-08-30 RX ADMIN — INSULIN LISPRO 3 UNITS: 100 INJECTION, SOLUTION INTRAVENOUS; SUBCUTANEOUS at 21:31

## 2019-08-30 RX ADMIN — ACETAMINOPHEN 650 MG: 325 TABLET ORAL at 22:22

## 2019-08-30 RX ADMIN — INSULIN LISPRO 4 UNITS: 100 INJECTION, SOLUTION INTRAVENOUS; SUBCUTANEOUS at 16:52

## 2019-08-30 RX ADMIN — INSULIN LISPRO 8 UNITS: 100 INJECTION, SOLUTION INTRAVENOUS; SUBCUTANEOUS at 16:52

## 2019-08-30 RX ADMIN — IBUPROFEN 400 MG: 400 TABLET ORAL at 21:37

## 2019-08-30 RX ADMIN — ACETAMINOPHEN 650 MG: 325 TABLET ORAL at 07:02

## 2019-08-30 RX ADMIN — INSULIN GLARGINE 12 UNITS: 100 INJECTION, SOLUTION SUBCUTANEOUS at 21:33

## 2019-08-30 RX ADMIN — INSULIN LISPRO 2 UNITS: 100 INJECTION, SOLUTION INTRAVENOUS; SUBCUTANEOUS at 07:53

## 2019-08-30 ASSESSMENT — PAIN DESCRIPTION - PAIN TYPE
TYPE: ACUTE PAIN

## 2019-08-30 ASSESSMENT — PAIN DESCRIPTION - LOCATION
LOCATION: TEETH
LOCATION: HEAD
LOCATION: THROAT;TEETH
LOCATION: TEETH;THROAT

## 2019-08-30 ASSESSMENT — PAIN SCALES - GENERAL
PAINLEVEL_OUTOF10: 0
PAINLEVEL_OUTOF10: 4
PAINLEVEL_OUTOF10: 0
PAINLEVEL_OUTOF10: 6
PAINLEVEL_OUTOF10: 0
PAINLEVEL_OUTOF10: 3
PAINLEVEL_OUTOF10: 6
PAINLEVEL_OUTOF10: 2
PAINLEVEL_OUTOF10: 0

## 2019-08-30 ASSESSMENT — PAIN DESCRIPTION - ORIENTATION
ORIENTATION: LEFT

## 2019-08-30 ASSESSMENT — PAIN DESCRIPTION - FREQUENCY
FREQUENCY: CONTINUOUS
FREQUENCY: CONTINUOUS
FREQUENCY: INTERMITTENT

## 2019-08-30 ASSESSMENT — PAIN DESCRIPTION - ONSET
ONSET: ON-GOING
ONSET: ON-GOING
ONSET: GRADUAL

## 2019-08-30 ASSESSMENT — PAIN DESCRIPTION - PROGRESSION
CLINICAL_PROGRESSION: NOT CHANGED
CLINICAL_PROGRESSION: GRADUALLY WORSENING
CLINICAL_PROGRESSION: GRADUALLY WORSENING

## 2019-08-30 ASSESSMENT — PAIN DESCRIPTION - DESCRIPTORS
DESCRIPTORS: ACHING;DISCOMFORT;THROBBING
DESCRIPTORS: SORE;ACHING
DESCRIPTORS: SORE;ACHING
DESCRIPTORS: ACHING;DISCOMFORT;DULL;SORE

## 2019-08-30 ASSESSMENT — PAIN - FUNCTIONAL ASSESSMENT
PAIN_FUNCTIONAL_ASSESSMENT: ACTIVITIES ARE NOT PREVENTED
PAIN_FUNCTIONAL_ASSESSMENT: ACTIVITIES ARE NOT PREVENTED

## 2019-08-30 NOTE — PROGRESS NOTES
dextrose 50 % IV solution  12.5 g Intravenous PRN Letha Cortez MD        acetaminophen (TYLENOL) tablet 650 mg  650 mg Oral Q4H PRN Letha Cortez MD   650 mg at 19 0702    ondansetron [facility]) injection 4 mg  4 mg Intravenous Q6H PRN eLtha Cortez MD         Scheduled Meds:   insulin glargine  12 Units Subcutaneous Nightly    insulin lispro  0-3 Units Subcutaneous Nightly    potassium chloride  40 mEq Oral Once    insulin lispro  3 Units Subcutaneous TID WC    insulin lispro  0-6 Units Subcutaneous TID WC    nicotine  1 patch Transdermal Daily       Continuous Infusions:   dextrose           Data:   Temperature:  Current - Temp: 98 °F (36.7 °C); Max - Temp  Av.4 °F (36.9 °C)  Min: 98 °F (36.7 °C)  Max: 99 °F (37.2 °C)    Respiratory Rate : Resp  Avg: 15.4  Min: 11  Max: 29    Pulse Range: Pulse  Av  Min: 70  Max: 100    Blood Presuure Range:  Systolic (51AGW), CNF:045 , Min:98 , KPQ:913   ; Diastolic (93AJJ), ION:80, Min:56, Max:82      Pulse ox Range: SpO2  Av.5 %  Min: 97 %  Max: 100 %    Patient Vitals for the past 8 hrs:   BP Temp Temp src Pulse Resp SpO2   19 1115 110/66 98 °F (36.7 °C) Oral 80 12 100 %   19 0900 109/61 -- -- 90 13 --   19 0800 107/69 99 °F (37.2 °C) Oral 88 20 --   19 0700 107/69 -- -- 90 13 --   19 0600 (!) 104/59 -- -- 78 13 --   19 0500 (!) 112/56 -- -- 80 15 --   19 0400 (!) 112/56 98 °F (36.7 °C) Oral 82 15 97 %         Intake/Output Summary (Last 24 hours) at 2019 1136  Last data filed at 2019 1830  Gross per 24 hour   Intake 1093.17 ml   Output 350 ml   Net 743.17 ml       I/O last 3 completed shifts: In: 1093.2 [I.V.:1093.2]  Out: 350 [Urine:350]    No intake/output data recorded.     Wt Readings from Last 3 Encounters:   19 130 lb 8.2 oz (59.2 kg)   19 130 lb 11.2 oz (59.3 kg)       Labs:   CBC:   Lab Results   Component Value Date    WBC 9.6 2019    RBC 4.44 2019    HGB 14.4 08/30/2019    HCT 41.8 08/30/2019    MCV 94.1 08/30/2019    MCH 32.4 08/30/2019    MCHC 34.4 08/30/2019    RDW 12.7 08/30/2019     08/30/2019    MPV 10.1 08/30/2019     CBC with Differential:    Lab Results   Component Value Date    WBC 9.6 08/30/2019    RBC 4.44 08/30/2019    HGB 14.4 08/30/2019    HCT 41.8 08/30/2019     08/30/2019    MCV 94.1 08/30/2019    MCH 32.4 08/30/2019    MCHC 34.4 08/30/2019    RDW 12.7 08/30/2019    LYMPHOPCT 11.0 08/28/2019    MONOPCT 7.0 08/28/2019    BASOPCT 0.0 08/28/2019    MONOSABS 0.90 08/28/2019    LYMPHSABS 1.41 08/28/2019    EOSABS 0.51 08/28/2019    BASOSABS 0.00 08/28/2019     Hemoglobin/Hematocrit:    Lab Results   Component Value Date    HGB 14.4 08/30/2019    HCT 41.8 08/30/2019     CMP:    Lab Results   Component Value Date     08/30/2019    K 3.2 08/30/2019     08/30/2019    CO2 25 08/30/2019    BUN 8 08/30/2019    CREATININE 0.6 08/30/2019    GFRAA >60 08/30/2019    LABGLOM >60 08/30/2019    GLUCOSE 73 08/30/2019    PROT 5.8 08/30/2019    LABALBU 3.3 08/30/2019    CALCIUM 8.4 08/30/2019    BILITOT <0.2 08/30/2019    ALKPHOS 94 08/30/2019    AST 25 08/30/2019    ALT 21 08/30/2019     BMP:    Lab Results   Component Value Date     08/30/2019    K 3.2 08/30/2019     08/30/2019    CO2 25 08/30/2019    BUN 8 08/30/2019    LABALBU 3.3 08/30/2019    CREATININE 0.6 08/30/2019    CALCIUM 8.4 08/30/2019    GFRAA >60 08/30/2019    LABGLOM >60 08/30/2019    GLUCOSE 73 08/30/2019     Hepatic Function Panel:    Lab Results   Component Value Date    ALKPHOS 94 08/30/2019    ALT 21 08/30/2019    AST 25 08/30/2019    PROT 5.8 08/30/2019    BILITOT <0.2 08/30/2019    BILIDIR <0.2 08/30/2019    IBILI see below 08/30/2019    LABALBU 3.3 08/30/2019     Ionized Calcium:  No results found for: IONCA  Magnesium:    Lab Results   Component Value Date    MG 2.0 08/30/2019     Phosphorus:    Lab Results   Component Value Date    PHOS 3.3 08/30/2019 LDH:  No results found for: LDH  Uric Acid:  No results found for: LABURIC, URICACID  PT/INR:  No results found for: PROTIME, INR  Warfarin PT/INR:  No components found for: PTPATWAR, PTINRWAR  PTT:  No results found for: APTT, PTT[APTT}  Troponin:  No results found for: TROPONINI  Last 3 Troponin:  No results found for: TROPONINI  U/A:    Lab Results   Component Value Date    COLORU Yellow 08/29/2019    PROTEINU 30 08/29/2019    PHUR 5.5 08/29/2019    LABCAST RARE 08/29/2019    WBCUA NONE 08/29/2019    RBCUA 0-1 08/29/2019    BACTERIA RARE 08/29/2019    CLARITYU Clear 08/29/2019    SPECGRAV >=1.030 08/29/2019    LEUKOCYTESUR Negative 08/29/2019    UROBILINOGEN 0.2 08/29/2019    BILIRUBINUR MODERATE 08/29/2019    BLOODU Negative 08/29/2019    GLUCOSEU 500 08/29/2019    AMORPHOUS FEW 08/29/2019     HgBA1c:  No results found for: LABA1C  FLP:  No results found for: TRIG, HDL, LDLCALC, LDLDIRECT, LABVLDL  TSH:    Lab Results   Component Value Date    TSH 9.690 08/30/2019     VITAMIN B12: No components found for: B12  FOLATE:  No results found for: FOLATE  LIPASE:    Lab Results   Component Value Date    LIPASE 29 08/28/2019        CBC:  Recent Labs     08/28/19  1213 08/29/19  0620 08/30/19  0630   WBC 12.8* 10.6 9.6   RBC 4.81 4.11 4.44   HGB 15.8 13.3 14.4   HCT 45.6 38.0 41.8    223 252   MCV 94.8 92.5 94.1   MCH 32.8 32.4 32.4   MCHC 34.6* 35.0* 34.4   RDW 12.4 12.5 12.7   LYMPHOPCT 11.0*  --   --    MONOPCT 7.0  --   --    BASOPCT 0.0  --   --    MONOSABS 0.90  --   --    LYMPHSABS 1.41*  --   --    EOSABS 0.51*  --   --    BASOSABS 0.00  --   --           H & H :  Recent Labs     08/28/19  1213 08/29/19  0620 08/30/19  0630   HGB 15.8 13.3 14.4       TSH:  Recent Labs     08/28/19  1213 08/30/19  0630   TSH 4.420* 9.690*       GLUCOSE:No results for input(s): POCGLU in the last 72 hours.     CMP:  Recent Labs     08/28/19  1213  08/29/19  0215 08/29/19  0620 08/30/19  0630   *   < > 133 134 139

## 2019-08-30 NOTE — PROGRESS NOTES
Patient requesting his bag with personal belongings, including cell phone , pack of chewing gum, and water bottles. RN searches room for belongings, none present in room or on unit. RN contacted [facility] ([facility] ED, this was not present in lost and found. RN contacted [facility], this bag was not located in any of the transport vehicles. Patient notified that belongings were not found. Educated him on policy that police can be brought to room to file report for missing items. Patient declines.

## 2019-08-31 ENCOUNTER — TELEPHONE (OUTPATIENT)
Dept: ENDOCRINOLOGY | Age: 29
End: 2019-08-31

## 2019-08-31 VITALS
OXYGEN SATURATION: 99 % | SYSTOLIC BLOOD PRESSURE: 103 MMHG | HEIGHT: 71 IN | WEIGHT: 138 LBS | DIASTOLIC BLOOD PRESSURE: 75 MMHG | RESPIRATION RATE: 16 BRPM | TEMPERATURE: 98.3 F | BODY MASS INDEX: 19.32 KG/M2 | HEART RATE: 81 BPM

## 2019-08-31 DIAGNOSIS — R94.6 ABNORMAL THYROID FUNCTION TEST: ICD-10-CM

## 2019-08-31 DIAGNOSIS — E10.9 TYPE 1 DIABETES MELLITUS WITHOUT COMPLICATION (HCC): Primary | ICD-10-CM

## 2019-08-31 LAB
ALBUMIN SERPL-MCNC: 3.4 G/DL (ref 3.5–5.2)
ALP BLD-CCNC: 102 U/L (ref 40–129)
ALT SERPL-CCNC: 30 U/L (ref 0–40)
ANION GAP SERPL CALCULATED.3IONS-SCNC: 10 MMOL/L (ref 7–16)
AST SERPL-CCNC: 46 U/L (ref 0–39)
BILIRUB SERPL-MCNC: <0.2 MG/DL (ref 0–1.2)
BILIRUBIN DIRECT: <0.2 MG/DL (ref 0–0.3)
BILIRUBIN, INDIRECT: ABNORMAL MG/DL (ref 0–1)
BUN BLDV-MCNC: 16 MG/DL (ref 6–20)
CALCIUM SERPL-MCNC: 8.7 MG/DL (ref 8.6–10.2)
CHLORIDE BLD-SCNC: 103 MMOL/L (ref 98–107)
CO2: 25 MMOL/L (ref 22–29)
CREAT SERPL-MCNC: 0.7 MG/DL (ref 0.7–1.2)
GFR AFRICAN AMERICAN: >60
GFR NON-AFRICAN AMERICAN: >60 ML/MIN/1.73
GLUCOSE BLD-MCNC: 177 MG/DL (ref 74–99)
HCT VFR BLD CALC: 40.9 % (ref 37–54)
HEMOGLOBIN: 13.8 G/DL (ref 12.5–16.5)
MAGNESIUM: 2 MG/DL (ref 1.6–2.6)
MCH RBC QN AUTO: 32.3 PG (ref 26–35)
MCHC RBC AUTO-ENTMCNC: 33.7 % (ref 32–34.5)
MCV RBC AUTO: 95.8 FL (ref 80–99.9)
METER GLUCOSE: 167 MG/DL (ref 74–99)
METER GLUCOSE: 266 MG/DL (ref 74–99)
PDW BLD-RTO: 12.6 FL (ref 11.5–15)
PHOSPHORUS: 4.3 MG/DL (ref 2.5–4.5)
PLATELET # BLD: 193 E9/L (ref 130–450)
PMV BLD AUTO: 10.2 FL (ref 7–12)
POTASSIUM SERPL-SCNC: 4 MMOL/L (ref 3.5–5)
RBC # BLD: 4.27 E12/L (ref 3.8–5.8)
SODIUM BLD-SCNC: 138 MMOL/L (ref 132–146)
TOTAL PROTEIN: 6 G/DL (ref 6.4–8.3)
URINE CULTURE, ROUTINE: NORMAL
WBC # BLD: 7.4 E9/L (ref 4.5–11.5)

## 2019-08-31 PROCEDURE — 85027 COMPLETE CBC AUTOMATED: CPT

## 2019-08-31 PROCEDURE — 6370000000 HC RX 637 (ALT 250 FOR IP): Performed by: INTERNAL MEDICINE

## 2019-08-31 PROCEDURE — 82962 GLUCOSE BLOOD TEST: CPT

## 2019-08-31 PROCEDURE — 6360000002 HC RX W HCPCS

## 2019-08-31 PROCEDURE — 80076 HEPATIC FUNCTION PANEL: CPT

## 2019-08-31 PROCEDURE — 80048 BASIC METABOLIC PNL TOTAL CA: CPT

## 2019-08-31 PROCEDURE — 83735 ASSAY OF MAGNESIUM: CPT

## 2019-08-31 PROCEDURE — 84100 ASSAY OF PHOSPHORUS: CPT

## 2019-08-31 PROCEDURE — 99232 SBSQ HOSP IP/OBS MODERATE 35: CPT | Performed by: INTERNAL MEDICINE

## 2019-08-31 PROCEDURE — 2580000003 HC RX 258

## 2019-08-31 PROCEDURE — 36415 COLL VENOUS BLD VENIPUNCTURE: CPT

## 2019-08-31 RX ORDER — INSULIN GLARGINE 100 [IU]/ML
12 INJECTION, SOLUTION SUBCUTANEOUS NIGHTLY
Qty: 1 VIAL | Refills: 3 | Status: SHIPPED | OUTPATIENT
Start: 2019-08-31 | End: 2019-09-16 | Stop reason: SDUPTHER

## 2019-08-31 RX ORDER — KETOROLAC TROMETHAMINE 30 MG/ML
15 INJECTION, SOLUTION INTRAMUSCULAR; INTRAVENOUS EVERY 6 HOURS PRN
Status: DISCONTINUED | OUTPATIENT
Start: 2019-08-31 | End: 2019-08-31 | Stop reason: HOSPADM

## 2019-08-31 RX ORDER — LEVOTHYROXINE SODIUM 0.03 MG/1
25 TABLET ORAL DAILY
Qty: 30 TABLET | Refills: 3 | Status: SHIPPED | OUTPATIENT
Start: 2019-09-01 | End: 2020-01-09 | Stop reason: SDUPTHER

## 2019-08-31 RX ORDER — SODIUM CHLORIDE 0.9 % (FLUSH) 0.9 %
SYRINGE (ML) INJECTION
Status: COMPLETED
Start: 2019-08-31 | End: 2019-08-31

## 2019-08-31 RX ORDER — NICOTINE 21 MG/24HR
1 PATCH, TRANSDERMAL 24 HOURS TRANSDERMAL DAILY
Qty: 30 PATCH | Refills: 3 | Status: SHIPPED | OUTPATIENT
Start: 2019-09-01 | End: 2019-09-16

## 2019-08-31 RX ORDER — HYDROCODONE BITARTRATE AND ACETAMINOPHEN 5; 325 MG/1; MG/1
1 TABLET ORAL EVERY 6 HOURS PRN
Status: DISCONTINUED | OUTPATIENT
Start: 2019-08-31 | End: 2019-08-31 | Stop reason: HOSPADM

## 2019-08-31 RX ORDER — KETOROLAC TROMETHAMINE 30 MG/ML
INJECTION, SOLUTION INTRAMUSCULAR; INTRAVENOUS
Status: COMPLETED
Start: 2019-08-31 | End: 2019-08-31

## 2019-08-31 RX ORDER — LEVOTHYROXINE SODIUM 0.03 MG/1
25 TABLET ORAL DAILY
Status: DISCONTINUED | OUTPATIENT
Start: 2019-09-01 | End: 2019-08-31 | Stop reason: HOSPADM

## 2019-08-31 RX ADMIN — INSULIN LISPRO 4 UNITS: 100 INJECTION, SOLUTION INTRAVENOUS; SUBCUTANEOUS at 12:13

## 2019-08-31 RX ADMIN — INSULIN LISPRO 4 UNITS: 100 INJECTION, SOLUTION INTRAVENOUS; SUBCUTANEOUS at 08:45

## 2019-08-31 RX ADMIN — KETOROLAC TROMETHAMINE 15 MG: 30 INJECTION, SOLUTION INTRAMUSCULAR; INTRAVENOUS at 10:09

## 2019-08-31 RX ADMIN — KETOROLAC TROMETHAMINE 15 MG: 30 INJECTION, SOLUTION INTRAMUSCULAR at 10:09

## 2019-08-31 RX ADMIN — INSULIN LISPRO 6 UNITS: 100 INJECTION, SOLUTION INTRAVENOUS; SUBCUTANEOUS at 12:13

## 2019-08-31 RX ADMIN — INSULIN LISPRO 2 UNITS: 100 INJECTION, SOLUTION INTRAVENOUS; SUBCUTANEOUS at 08:45

## 2019-08-31 RX ADMIN — Medication 10 ML: at 10:09

## 2019-08-31 RX ADMIN — IBUPROFEN 400 MG: 400 TABLET ORAL at 05:08

## 2019-08-31 ASSESSMENT — PAIN DESCRIPTION - ORIENTATION: ORIENTATION: LEFT

## 2019-08-31 ASSESSMENT — PAIN DESCRIPTION - LOCATION: LOCATION: MOUTH;TEETH

## 2019-08-31 ASSESSMENT — PAIN DESCRIPTION - PROGRESSION: CLINICAL_PROGRESSION: NOT CHANGED

## 2019-08-31 ASSESSMENT — PAIN SCALES - GENERAL
PAINLEVEL_OUTOF10: 5
PAINLEVEL_OUTOF10: 2
PAINLEVEL_OUTOF10: 8

## 2019-08-31 ASSESSMENT — PAIN DESCRIPTION - ONSET: ONSET: ON-GOING

## 2019-08-31 ASSESSMENT — PAIN DESCRIPTION - DESCRIPTORS: DESCRIPTORS: ACHING;SORE;THROBBING

## 2019-08-31 ASSESSMENT — PAIN DESCRIPTION - PAIN TYPE: TYPE: ACUTE PAIN

## 2019-08-31 ASSESSMENT — PAIN - FUNCTIONAL ASSESSMENT: PAIN_FUNCTIONAL_ASSESSMENT: ACTIVITIES ARE NOT PREVENTED

## 2019-08-31 ASSESSMENT — PAIN DESCRIPTION - FREQUENCY: FREQUENCY: CONTINUOUS

## 2019-08-31 NOTE — PROGRESS NOTES
Medical Records/Labs/Images review:   I personally reviewed and summarized previous records   All labs were reviewed independently     [address], a 33 y.o.-old male seen in the hospital today for diabetes management     Newly diagnosed DM  · Will adjust insulin regimen to: Lantus 12 units daily at bedtime, Humalog 4 units before meals + medium dose sliding scale     · Glucose check before meals and at bed time   · On discharge, we recommend sending pt on the insulin regimen above  · Will titrate insulin dose based on the blood glucose trend & insulin requirement  · Goal pre-prandial glucose  mg/dl and peak postprandial glucose <180 mg/dl  · C-peptide and TEAGAN-65 Ab in process      · Endo follow up appointment: Monday 9/16 at 3:30      Primary Hypothyroidism   · Will start Levothyroxine 25 mcg daily. Patient was instructed to take levothyroxine in the morning at empty stomach, wait one hour before eating  · To repeat labs 6-8 wks after discharge   · AM cortisol was normal     Interdisciplinary plan for communication with healthcare providers:   Consult recommendations were discussed with the Primary Service/Nursing staff      The above issues were reviewed with the patient who understood and agreed with the plan. Thank you for allowing us to participate in the care of this patient. Please do not hesitate to contact us with any additional questions.      Yolanda Crandall MD  Endocrinologist, [facility]   [address] [facility] [address]   Phone: [phone]  Fax: [phone]  --------------------------------------------  Electronically signed by Binta Leon MD

## 2019-08-31 NOTE — PROGRESS NOTES
Patient and mother present for discharge instructions. Detailed education given- pt able to check glucose using glucometer he will be discharged home with. All questions answered. Electronically signed by Johnson Erwin RN on 8/31/2019 at 3:52 PM Detail Level: Zone

## 2019-08-31 NOTE — PLAN OF CARE
Problem: Serum Glucose Level - Abnormal:  Goal: Ability to maintain appropriate glucose levels has stabilized  Description  Ability to maintain appropriate glucose levels has stabilized  Outcome: Met This Shift     Problem: Serum Glucose Level - Abnormal:  Goal: Ability to maintain appropriate glucose levels will improve  Description  Ability to maintain appropriate glucose levels will improve  8/31/2019 1223 by El Kendrick RN  Outcome: Met This Shift  8/31/2019 1222 by El Kendrick RN  Outcome: Met This Shift

## 2019-09-01 LAB
C-PEPTIDE: 0.3 NG/ML (ref 0.8–3.5)
EKG ATRIAL RATE: 83 BPM
EKG P AXIS: 70 DEGREES
EKG P-R INTERVAL: 136 MS
EKG Q-T INTERVAL: 360 MS
EKG QRS DURATION: 90 MS
EKG QTC CALCULATION (BAZETT): 423 MS
EKG R AXIS: 81 DEGREES
EKG T AXIS: 62 DEGREES
EKG VENTRICULAR RATE: 83 BPM

## 2019-09-02 LAB — GLUTAMIC ACID DECARB AB: 85.9 IU/ML (ref 0–5)

## 2019-09-03 LAB — HBA1C MFR BLD: >16.5 %

## 2019-09-03 NOTE — TELEPHONE ENCOUNTER
[facility] from the Diabetes Education department will call percy to try to set up education classes.

## 2019-09-04 ENCOUNTER — TELEPHONE (OUTPATIENT)
Dept: ENDOCRINOLOGY | Age: 29
End: 2019-09-04

## 2019-09-04 NOTE — TELEPHONE ENCOUNTER
Kentrell Ruiz from [facility] stated she spoke with Mr Ronnie Arizmendi, he is declining education classes at this time. He does not have insurance at this time and is worried about cost. Kentrell Likes offered him options for financial assistance for classes. He did not want to sign up for classes at this time.

## 2019-09-16 ENCOUNTER — OFFICE VISIT (OUTPATIENT)
Dept: ENDOCRINOLOGY | Age: 29
End: 2019-09-16
Payer: MEDICAID

## 2019-09-16 VITALS
SYSTOLIC BLOOD PRESSURE: 124 MMHG | DIASTOLIC BLOOD PRESSURE: 70 MMHG | HEART RATE: 85 BPM | HEIGHT: 70 IN | OXYGEN SATURATION: 98 % | BODY MASS INDEX: 20.33 KG/M2 | RESPIRATION RATE: 16 BRPM | WEIGHT: 142 LBS

## 2019-09-16 DIAGNOSIS — E55.9 VITAMIN D DEFICIENCY: ICD-10-CM

## 2019-09-16 DIAGNOSIS — E10.9 TYPE 1 DIABETES MELLITUS WITHOUT COMPLICATION (HCC): Primary | ICD-10-CM

## 2019-09-16 DIAGNOSIS — E03.9 HYPOTHYROIDISM, UNSPECIFIED TYPE: ICD-10-CM

## 2019-09-16 PROCEDURE — 99214 OFFICE O/P EST MOD 30 MIN: CPT | Performed by: INTERNAL MEDICINE

## 2019-09-16 RX ORDER — INSULIN GLARGINE 100 [IU]/ML
18 INJECTION, SOLUTION SUBCUTANEOUS EVERY MORNING
Qty: 4 VIAL | Refills: 5
Start: 2019-09-16 | End: 2020-01-09 | Stop reason: SDUPTHER

## 2019-10-28 ENCOUNTER — HOSPITAL ENCOUNTER (EMERGENCY)
Age: 29
Discharge: HOME | End: 2019-10-28
Payer: MEDICAID

## 2019-10-28 VITALS
WEIGHT: 150 LBS | SYSTOLIC BLOOD PRESSURE: 134 MMHG | DIASTOLIC BLOOD PRESSURE: 90 MMHG | RESPIRATION RATE: 16 BRPM | OXYGEN SATURATION: 98 % | TEMPERATURE: 97.1 F | BODY MASS INDEX: 21.52 KG/M2 | HEART RATE: 70 BPM

## 2019-10-28 DIAGNOSIS — K04.7 DENTAL ABSCESS: ICD-10-CM

## 2019-10-28 DIAGNOSIS — K08.89 PAIN, DENTAL: Primary | ICD-10-CM

## 2019-10-28 PROCEDURE — 6370000000 HC RX 637 (ALT 250 FOR IP): Performed by: NURSE PRACTITIONER

## 2019-10-28 PROCEDURE — 99282 EMERGENCY DEPT VISIT SF MDM: CPT

## 2019-10-28 RX ORDER — IBUPROFEN 800 MG/1
800 TABLET ORAL ONCE
Status: COMPLETED | OUTPATIENT
Start: 2019-10-28 | End: 2019-10-28

## 2019-10-28 RX ORDER — ACETAMINOPHEN 500 MG
1000 TABLET ORAL EVERY 6 HOURS PRN
Qty: 30 TABLET | Refills: 0 | Status: SHIPPED | OUTPATIENT
Start: 2019-10-28 | End: 2020-01-16

## 2019-10-28 RX ORDER — LIDOCAINE HYDROCHLORIDE 20 MG/ML
15 SOLUTION OROPHARYNGEAL ONCE
Status: COMPLETED | OUTPATIENT
Start: 2019-10-28 | End: 2019-10-28

## 2019-10-28 RX ORDER — IBUPROFEN 800 MG/1
800 TABLET ORAL EVERY 8 HOURS PRN
Qty: 21 TABLET | Refills: 0 | Status: SHIPPED | OUTPATIENT
Start: 2019-10-28 | End: 2020-01-16

## 2019-10-28 RX ORDER — AMOXICILLIN AND CLAVULANATE POTASSIUM 875; 125 MG/1; MG/1
1 TABLET, FILM COATED ORAL EVERY 12 HOURS SCHEDULED
Status: DISCONTINUED | OUTPATIENT
Start: 2019-10-28 | End: 2019-10-28 | Stop reason: HOSPADM

## 2019-10-28 RX ORDER — CHLORHEXIDINE GLUCONATE 0.12 MG/ML
15 RINSE ORAL 2 TIMES DAILY
Qty: 420 ML | Refills: 0 | Status: SHIPPED | OUTPATIENT
Start: 2019-10-28 | End: 2019-11-11

## 2019-10-28 RX ORDER — AMOXICILLIN AND CLAVULANATE POTASSIUM 875; 125 MG/1; MG/1
1 TABLET, FILM COATED ORAL 2 TIMES DAILY WITH MEALS
Qty: 20 TABLET | Refills: 0 | Status: SHIPPED | OUTPATIENT
Start: 2019-10-28 | End: 2019-11-07

## 2019-10-28 RX ADMIN — IBUPROFEN 800 MG: 800 TABLET, FILM COATED ORAL at 08:28

## 2019-10-28 RX ADMIN — LIDOCAINE HYDROCHLORIDE 15 ML: 20 SOLUTION ORAL; TOPICAL at 08:28

## 2019-10-28 RX ADMIN — AMOXICILLIN AND CLAVULANATE POTASSIUM 1 TABLET: 875; 125 TABLET, FILM COATED ORAL at 08:28

## 2019-10-28 ASSESSMENT — PAIN SCALES - GENERAL: PAINLEVEL_OUTOF10: 8

## 2020-01-09 RX ORDER — LEVOTHYROXINE SODIUM 0.03 MG/1
25 TABLET ORAL DAILY
Qty: 90 TABLET | Refills: 3 | Status: SHIPPED | OUTPATIENT
Start: 2020-01-09

## 2020-01-09 RX ORDER — BLOOD-GLUCOSE METER
KIT MISCELLANEOUS
Qty: 1 KIT | Refills: 0 | Status: SHIPPED
Start: 2020-01-09 | End: 2022-01-12 | Stop reason: ALTCHOICE

## 2020-01-09 RX ORDER — IBUPROFEN 200 MG
TABLET ORAL
Qty: 400 EACH | Refills: 3 | Status: SHIPPED | OUTPATIENT
Start: 2020-01-09

## 2020-01-09 RX ORDER — INSULIN GLARGINE 100 [IU]/ML
18 INJECTION, SOLUTION SUBCUTANEOUS EVERY MORNING
Qty: 3 VIAL | Refills: 3 | Status: SHIPPED
Start: 2020-01-09 | End: 2022-01-12

## 2020-01-16 ENCOUNTER — OFFICE VISIT (OUTPATIENT)
Dept: ENDOCRINOLOGY | Age: 30
End: 2020-01-16
Payer: MEDICAID

## 2020-01-16 VITALS
DIASTOLIC BLOOD PRESSURE: 68 MMHG | OXYGEN SATURATION: 98 % | HEART RATE: 110 BPM | SYSTOLIC BLOOD PRESSURE: 118 MMHG | HEIGHT: 70 IN | BODY MASS INDEX: 21.7 KG/M2 | WEIGHT: 151.6 LBS | RESPIRATION RATE: 16 BRPM

## 2020-01-16 LAB — HBA1C MFR BLD: 11.4 %

## 2020-01-16 PROCEDURE — 83036 HEMOGLOBIN GLYCOSYLATED A1C: CPT | Performed by: INTERNAL MEDICINE

## 2020-01-16 PROCEDURE — 99214 OFFICE O/P EST MOD 30 MIN: CPT | Performed by: INTERNAL MEDICINE

## 2020-01-16 NOTE — PROGRESS NOTES
multivitamins containing calcium  or iron with it. Levothyroxine was started during recent hospitalization few weeks ago   Lab Results   Component Value Date/Time    TSH 9.690 (H) 08/30/2019 06:30 AM    T4FREE 0.87 (L) 08/30/2019 06:30 AM    F6GCUWT 4.6 08/30/2019 06:30 AM       PAST MEDICAL HISTORY   Past Medical History:   Diagnosis Date    [facility]diabetic k[facility]acidoses) [facility]     Hyperkalemia 8/28/2019    Leukocytosis 0/50/3082    Metabolic acidosis due to diabetes mellitus ([facility]     [facility] 8/28/2019    Subclinical hypothyroidism 8/28/2019    Tobacco abuse 8/28/2019    Vertigo 2012     PAST SURGICAL HISTORY   No past surgical history on file.   SOCIAL HISTORY   Social History     Socioeconomic History    Marital status: Single     Spouse name: Not on file    Number of children: Not on file    Years of education: Not on file    Highest education level: Not on file   Occupational History    Not on file   Social Needs    Financial resource strain: Not on file    Food insecurity:     Worry: Not on file     Inability: Not on file    Transportation needs:     Medical: Not on file     Non-medical: Not on file   Tobacco Use    Smoking status: Current Every Day Smoker     Packs/day: 0.50     Types: Cigarettes     Start date: 8/29/2006    Smokeless tobacco: Former User   Substance and Sexual Activity    Alcohol use: Not Currently    Drug use: Not on file    Sexual activity: Not on file   Lifestyle    Physical activity:     Days per week: Not on file     Minutes per session: Not on file    Stress: Not on file   Relationships    Social connections:     Talks on phone: Not on file     Gets together: Not on file     Attends Yarsani service: Not on file     Active member of club or organization: Not on file     Attends meetings of clubs or organizations: Not on file     Relationship status: Not on file    Intimate partner violence:     Fear of current or ex partner: Not on file     Emotionally abused: Not on file     Physically abused: Not on file     Forced sexual activity: Not on file   Other Topics Concern    Not on file   Social History Narrative    Not on file     FAMILY HISTORY   Family History   Problem Relation Age of Onset    Thyroid Disease Mother         Age 58, 2019    Thyroid Disease Sister     Diabetes Father         Age 58; 2019; type 2 diabetes     ALLERGIES AND DRUG REACTIONS   No Known Allergies    CURRENT MEDICATIONS   Current Outpatient Medications   Medication Sig Dispense Refill    levothyroxine (SYNTHROID) 25 MCG tablet Take 1 tablet by mouth Daily 90 tablet 3    insulin lispro (HUMALOG) 100 UNIT/ML injection vial Inject 6 Units into the skin 3 times daily (with meals) (Patient taking differently: Inject into the skin 3 times daily (with meals) PER SS) 3 vial 1    insulin glargine (LANTUS) 100 UNIT/ML injection vial Inject 18 Units into the skin every morning 3 vial 3    Insulin Syringe-Needle U-100 (INSULIN SYRINGE .3CC/29GX1/2\") 29G X 1/2\" 0.3 ML MISC To inject 4x/day 400 each 3    blood glucose test strips (FREESTYLE LITE) strip 1 each by In Vitro route 5 times daily As needed. 500 each 3    Blood Glucose Monitoring Suppl (FREESTYLE FREEDOM LITE) w/Device KIT To test 5x/day 1 kit 0    ibuprofen (IBU) 800 MG tablet Take 1 tablet by mouth every 8 hours as needed for Pain Take with food. (Patient not taking: Reported on 1/16/2020) 21 tablet 0    acetaminophen (TYLENOL) 500 MG tablet Take 2 tablets by mouth every 6 hours as needed for Pain Maximum dose- 8 tablets/24 hours. (Patient not taking: Reported on 1/16/2020) 30 tablet 0     No current facility-administered medications for this visit. Review of Systems  Constitutional: No fever, no chills, no diaphoresis, no generalized weakness.   HEENT: No blurred vision, No sore throat, no ear pain, no hair loss  Neck: denied any neck swelling, difficulty swallowing, Cardio-pulmonary: No CP, SOB or palpitation, No orthopnea or PND. No cough or wheezing. GI: No N/V/D, no constipation, No abdominal pain, no melena or hematochezia   : Denied any dysuria, hematuria, flank pain, discharge, or incontinence. Skin: denied any rash, ulcer, Hirsute, or hyperpigmentation. MSK: denied any joint deformity, joint pain/swelling, muscle pain, or back pain. Neuro: no numbness, no tingling, no weakness, _    OBJECTIVE    /68 (Site: Left Upper Arm, Position: Sitting, Cuff Size: Medium Adult)   Pulse 110   Resp 16   Ht 5' 10\" (1.778 m)   Wt 151 lb 9.6 oz (68.8 kg)   SpO2 98%   BMI 21.75 kg/m²   BP Readings from Last 4 Encounters:   01/16/20 118/68   10/28/19 (!) 134/90   09/16/19 124/70   08/31/19 103/75     Wt Readings from Last 6 Encounters:   01/16/20 151 lb 9.6 oz (68.8 kg)   10/28/19 150 lb (68 kg)   09/16/19 142 lb (64.4 kg)   08/31/19 138 lb (62.6 kg)   08/28/19 130 lb 11.2 oz (59.3 kg)       Physical examination:  General: awake alert, oriented x3, no abnormal position or movements. HEENT: normocephalic non-traumatic, no exophthalmos   Neck: supple, no LN enlargement, no thyromegaly, no thyroid tenderness, no JVD. Pulm: Clear equal air entry no added sounds, no wheezing or rhonchi    CVS: S1 + S2, no murmur, no heave. Dorsalis pedis pulse palpable   Abd: soft lax, no tenderness, no organomegaly, audible bowel sounds. Skin: warm, no lesions, no rash.  Mild callus, no Ulcers, No acanthosis nigricans  Musculoskeletal: No back tenderness, no kyphosis/scoliosis    Neuro: CN intact, Monofilament sensation present bilateral , muscle power normal  Psych: normal mood, and affect      Review of Laboratory Data:  I personally reviewed the following lab:  Lab Results   Component Value Date/Time    WBC 7.4 08/31/2019 05:20 AM    RBC 4.27 08/31/2019 05:20 AM    HGB 13.8 08/31/2019 05:20 AM    HCT 40.9 08/31/2019 05:20 AM    MCV 95.8 08/31/2019 05:20 AM    MCH 32.3 08/31/2019 exercise per week. · Diabetes labs before next visit     Primary Hypothyroidism   · Currently on levothyroxine 25 mcg daily  · Check TFT and adjust the dose if needed     VitD deficiency   · Continue vitD supplement   · Check vitD     Return in about 3 months (around 4/16/2020) for DM type 1 . The above issues were reviewed with the patient who understood and agreed with the plan. Thank you for allowing us to participate in the care of this patient. Please do not hesitate to contact us with any additional questions. Diagnosis Orders   1. Type 1 diabetes mellitus without complication (HCC)  POCT glycosylated hemoglobin (Hb A1C)    Microalbumin / Creatinine Urine Ratio    Hemoglobin F9R    Basic Metabolic Panel   2. Hypothyroidism, unspecified type  TSH without Reflex    T4, Free   3.  Vitamin D deficiency  Vitamin D 25 Hydroxy       Fareed Olson MD  Endocrinologist, [facility] End[facility]   [address] [place] [id number]   Phone: [phone]  Fax: [phone]  --------------------------------------------  Electronically signed by Dennise Velazquez MD

## 2020-10-18 ENCOUNTER — HOSPITAL ENCOUNTER (EMERGENCY)
Age: 30
Discharge: HOME | End: 2020-10-18
Payer: COMMERCIAL

## 2020-10-18 VITALS
DIASTOLIC BLOOD PRESSURE: 65 MMHG | TEMPERATURE: 97.9 F | HEART RATE: 78 BPM | BODY MASS INDEX: 21.67 KG/M2 | SYSTOLIC BLOOD PRESSURE: 128 MMHG | RESPIRATION RATE: 16 BRPM | WEIGHT: 151 LBS | OXYGEN SATURATION: 98 %

## 2020-10-18 LAB
ANION GAP SERPL CALCULATED.3IONS-SCNC: 10 MMOL/L (ref 7–16)
BUN BLDV-MCNC: 9 MG/DL (ref 6–20)
CALCIUM SERPL-MCNC: 9.3 MG/DL (ref 8.6–10.2)
CHLORIDE BLD-SCNC: 104 MMOL/L (ref 98–107)
CO2: 25 MMOL/L (ref 22–29)
CREAT SERPL-MCNC: 0.8 MG/DL (ref 0.7–1.2)
GFR AFRICAN AMERICAN: >60
GFR NON-AFRICAN AMERICAN: >60 ML/MIN/1.73
GLUCOSE BLD-MCNC: 83 MG/DL (ref 74–99)
POTASSIUM SERPL-SCNC: 4.2 MMOL/L (ref 3.5–5)
SODIUM BLD-SCNC: 139 MMOL/L (ref 132–146)
TOTAL CK: 75 U/L (ref 20–200)

## 2020-10-18 PROCEDURE — 82550 ASSAY OF CK (CPK): CPT

## 2020-10-18 PROCEDURE — 36415 COLL VENOUS BLD VENIPUNCTURE: CPT

## 2020-10-18 PROCEDURE — 99284 EMERGENCY DEPT VISIT MOD MDM: CPT

## 2020-10-18 PROCEDURE — 80048 BASIC METABOLIC PNL TOTAL CA: CPT

## 2020-10-18 PROCEDURE — 99283 EMERGENCY DEPT VISIT LOW MDM: CPT

## 2020-10-19 ENCOUNTER — TELEPHONE (OUTPATIENT)
Dept: NEUROLOGY | Age: 30
End: 2020-10-19

## 2020-10-19 NOTE — TELEPHONE ENCOUNTER
----- Message from Uma Paniagua MD sent at 10/19/2020 11:00 AM EDT -----  Patient needs scheduled.   ----- Message -----  From: Automatic Discharge Provider  Sent: 10/18/2020  10:38 PM EDT  To: Uma Paniagua MD

## 2020-10-19 NOTE — ED PROVIDER NOTES
10/18/20 1949 10/18/20 1949 -- 10/18/20 1946   134/80 97.9 °F (36.6 °C)  97 16 100 %  151 lb (68.5 kg)      Oxygen Saturation Interpretation: Normal.    Constitutional:  Alert, development consistent with age. Neck:  Normal ROM. Supple. Non-tender. Hand: Left dorsal hand            Tenderness: none. Swelling: None. Deformity: no.               Skin:  no erythema, rash or wounds noted. Neurovascular: Motor deficit: pt unable to extend the wrist..              Sensory deficit:   Pt has diminished sensation over dorsum o fhand radial side, radial side of wrist.              Pulse deficit: none. Capillary refill: normal.  Fingers:  all            Tenderness:  none. Swelling: None. Deformity: no.              ROM: diminished range of motion. Pt unable to extend the fingers            Skin:  no erythema, rash or wounds noted. Wrist:               Tenderness:  none. Swelling: None. Deformity: no.             ROM: diminished range of motion. Skin:  normal.    Lymphatics: No lymphangitis or adenopathy noted. Neurological:  Oriented. Motor functions intact. t. Lab / Imaging Results   (All laboratory and radiology results have been personally reviewed by myself)  Labs:  Results for orders placed or performed during the hospital encounter of 90/14/48   Basic Metabolic Panel   Result Value Ref Range    Sodium 139 132 - 146 mmol/L    Potassium 4.2 3.5 - 5.0 mmol/L    Chloride 104 98 - 107 mmol/L    CO2 25 22 - 29 mmol/L    Anion Gap 10 7 - 16 mmol/L    Glucose 83 74 - 99 mg/dL    BUN 9 6 - 20 mg/dL    CREATININE 0.8 0.7 - 1.2 mg/dL    GFR Non-African American >60 >=60 mL/min/1.73    GFR African American >60     Calcium 9.3 8.6 - 10.2 mg/dL   CK   Result Value Ref Range    Total CK 75 20 - 200 U/L     Imaging: All Radiology results interpreted by Radiologist unless otherwise noted.   No orders to display

## 2020-10-20 ENCOUNTER — OFFICE VISIT (OUTPATIENT)
Dept: NEUROLOGY | Age: 30
End: 2020-10-20
Payer: COMMERCIAL

## 2020-10-20 ENCOUNTER — HOSPITAL ENCOUNTER (OUTPATIENT)
Age: 30
Discharge: HOME | End: 2020-10-20
Payer: COMMERCIAL

## 2020-10-20 VITALS
WEIGHT: 160 LBS | RESPIRATION RATE: 16 BRPM | BODY MASS INDEX: 22.9 KG/M2 | HEIGHT: 70 IN | OXYGEN SATURATION: 97 % | TEMPERATURE: 97.6 F | DIASTOLIC BLOOD PRESSURE: 82 MMHG | HEART RATE: 82 BPM | SYSTOLIC BLOOD PRESSURE: 130 MMHG

## 2020-10-20 LAB
ANION GAP SERPL CALCULATED.3IONS-SCNC: 8 MMOL/L (ref 7–16)
BUN BLDV-MCNC: 10 MG/DL (ref 6–20)
CALCIUM SERPL-MCNC: 9.7 MG/DL (ref 8.6–10.2)
CHLORIDE BLD-SCNC: 100 MMOL/L (ref 98–107)
CO2: 27 MMOL/L (ref 22–29)
CREAT SERPL-MCNC: 0.9 MG/DL (ref 0.7–1.2)
CREATININE URINE: 106 MG/DL (ref 40–278)
GFR AFRICAN AMERICAN: >60
GFR NON-AFRICAN AMERICAN: >60 ML/MIN/1.73
GLUCOSE BLD-MCNC: 303 MG/DL (ref 74–99)
HBA1C MFR BLD: 7.7 % (ref 4–5.6)
MICROALBUMIN UR-MCNC: 13.9 MG/L
MICROALBUMIN/CREAT UR-RTO: 13.1 (ref 0–30)
POTASSIUM SERPL-SCNC: 4.5 MMOL/L (ref 3.5–5)
SODIUM BLD-SCNC: 135 MMOL/L (ref 132–146)
T4 FREE: 1.18 NG/DL (ref 0.93–1.7)
TSH SERPL DL<=0.05 MIU/L-ACNC: 4.1 UIU/ML (ref 0.27–4.2)
VITAMIN D 25-HYDROXY: 22 NG/ML (ref 30–100)

## 2020-10-20 PROCEDURE — 99204 OFFICE O/P NEW MOD 45 MIN: CPT | Performed by: PSYCHIATRY & NEUROLOGY

## 2020-10-20 PROCEDURE — 4004F PT TOBACCO SCREEN RCVD TLK: CPT | Performed by: PSYCHIATRY & NEUROLOGY

## 2020-10-20 PROCEDURE — 36415 COLL VENOUS BLD VENIPUNCTURE: CPT

## 2020-10-20 PROCEDURE — G8484 FLU IMMUNIZE NO ADMIN: HCPCS | Performed by: PSYCHIATRY & NEUROLOGY

## 2020-10-20 PROCEDURE — 82306 VITAMIN D 25 HYDROXY: CPT

## 2020-10-20 PROCEDURE — 82044 UR ALBUMIN SEMIQUANTITATIVE: CPT

## 2020-10-20 PROCEDURE — 82570 ASSAY OF URINE CREATININE: CPT

## 2020-10-20 PROCEDURE — 80048 BASIC METABOLIC PNL TOTAL CA: CPT

## 2020-10-20 PROCEDURE — 84443 ASSAY THYROID STIM HORMONE: CPT

## 2020-10-20 PROCEDURE — 84439 ASSAY OF FREE THYROXINE: CPT

## 2020-10-20 PROCEDURE — G8427 DOCREV CUR MEDS BY ELIG CLIN: HCPCS | Performed by: PSYCHIATRY & NEUROLOGY

## 2020-10-20 PROCEDURE — G8420 CALC BMI NORM PARAMETERS: HCPCS | Performed by: PSYCHIATRY & NEUROLOGY

## 2020-10-20 PROCEDURE — 83036 HEMOGLOBIN GLYCOSYLATED A1C: CPT

## 2020-10-20 SDOH — HEALTH STABILITY: MENTAL HEALTH: HOW OFTEN DO YOU HAVE A DRINK CONTAINING ALCOHOL?: 4 OR MORE TIMES A WEEK

## 2020-10-20 SDOH — HEALTH STABILITY: MENTAL HEALTH: HOW MANY STANDARD DRINKS CONTAINING ALCOHOL DO YOU HAVE ON A TYPICAL DAY?: 3 OR 4

## 2020-10-20 ASSESSMENT — ENCOUNTER SYMPTOMS
SHORTNESS OF BREATH: 0
NAUSEA: 0
PHOTOPHOBIA: 0
VOMITING: 0
TROUBLE SWALLOWING: 0

## 2020-10-20 NOTE — PROGRESS NOTES
NEUROLOGY NEW PATIENT NOTE     Date: 10/20/2020  Name: Elisa Rogers  MRN: [id number]  Patient's PCP: No primary care provider on file. REASON FOR VISIT/CHIEF COMPLAINT: Wrist drop     HISTORY OF PRESENT ILLNESS: Elisa Rogers is a 31 y.o.  male with past medical history of type 1 diabetes, hypothyroidism, history of DKA is coming in with a stroke. Patient reports that about 2 to 3 weeks ago the patient fell asleep in a chair and then woke up and went to sleep on the bed. At that time he noticed that he was unable to extend his wrist.  He also has accompanied pain in the left hand hand and forearm. Patient also reports numbness and tingling in the left thumb. Currently he has insulin pump for type 1 diabetes. 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 Sleep is normal, appetite is good, mood stable. PAST MEDICAL HISTORY:   Past Medical History:   Diagnosis Date    DKA (diabetic ketoacidoses) ([address]     Hyperkalemia 8/28/2019    Leukocytosis 3/63/6218    Metabolic acidosis due to diabetes mellitus ([address]     New onset type 1 diabetes mellitus, uncontrolled ([address] 8/28/2019    Subclinical hypothyroidism 8/28/2019    Tobacco abuse 8/28/2019    Vertigo 2012     PAST SURGICAL HISTORY: History reviewed. No pertinent surgical history.   FAMILY MEDICAL HISTORY:   Family History   Problem Relation Age of Onset    Thyroid Disease Mother         Age 58, 2019    Thyroid Disease Sister    Rossana [place] Diabetes Father         Age 58; 2019; type 2 diabetes     SOCIAL HISTORY:   Social History     Socioeconomic History    Marital status: Single     Spouse name: None    Number of children: None    Years of education: None    Highest education level: None   Occupational History    None   Social Needs    Financial resource strain: None  Food insecurity     Worry: None     Inability: None    Transportation needs     Medical: None     Non-medical: None   Tobacco Use    Smoking status: Current Every Day Smoker     Packs/day: 1.00     Types: Cigarettes     Start date: 8/29/2006    Smokeless tobacco: Former User   Substance and Sexual Activity    Alcohol use: Yes     Frequency: 4 or more times a week     Drinks per session: 3 or 4    Drug use: Yes     Types: Marijuana     Comment: last smoked 10/19/20    Sexual activity: None   Lifestyle    Physical activity     Days per week: None     Minutes per session: None    Stress: None   Relationships    Social connections     Talks on phone: None     Gets together: None     Attends Oriental orthodox service: None     Active member of club or organization: None     Attends meetings of clubs or organizations: None     Relationship status: None    Intimate partner violence     Fear of current or ex partner: None     Emotionally abused: None     Physically abused: None     Forced sexual activity: None   Other Topics Concern    None   Social History Narrative    None      E-Cigarettes Vaping or Juuling     Questions Responses    Vaping Use Never User    Start Date     Does device contain nicotine? Quit Date     Vaping Type          Allergy: No Known Allergies  MEDS:   Current Outpatient Medications:     insulin lispro (HUMALOG) 100 UNIT/ML injection vial, Inject 6 Units into the skin 3 times daily (with meals) Plus a sliding scale. A max of 30 units/day, Disp: 1 vial, Rfl: 5    Insulin Syringe-Needle U-100 (INSULIN SYRINGE .3CC/29GX1/2\") 29G X 1/2\" 0.3 ML MISC, To inject 4x/day, Disp: 400 each, Rfl: 3    blood glucose test strips (FREESTYLE LITE) strip, 1 each by In Vitro route 5 times daily As needed. , Disp: 500 each, Rfl: 3    Blood Glucose Monitoring Suppl (FREESTYLE FREEDOM LITE) w/Device KIT, To test 5x/day, Disp: 1 kit, Rfl: 0    levothyroxine (SYNTHROID) 25 MCG tablet, Take 1 tablet by mouth comprehension, naming, and repetition. Fund of knowledge appropriate for patient's level of education. Affect normal.    CRANIAL NERVES:  CN I: Not tested. CN II: Fundoscopic exam not performed. CN III, IV, VI: Pupils equal, round and reactive to light; extra ocular movements full and intact. CN V: Facial sensation normal.  CN VII: No facial asymmetry. CN VIII:  Hearing grossly normal bilaterally. No pathologic nystagmus or skew deviation. CN IX, X: Palate elevates symmetrically. CN XI: Shoulder shrug and chin rotation equal with intact strength. CN XII: Tongue protrusion midline. MOTOR: Normal bulk. Tone normal and symmetric throughout. Unable to extend the wrist completely.:   L Wrist extensors: 3+/5  Wrist flexors: Normal  Unable to activate brachioradialis muscle on the left side  Other muscles 5/5. ABNORMAL MOVEMENTS/TREMORS: No     REFLEXES: DTRs 2+; normal and symmetric throughout. Plantar response downgoing. SENSATION: Sensation grossly intact to fine touch, pain/temperature, vibration and position. COORDINATION: Finger-to-nose and heel to shin normal for age and symmetric. Finger tapping and alternating movements normal.    STATION: Negative Romberg. GAIT:  Normal heel, toe and tandem; no ataxia.      DIAGNOSTIC TESTS:     I have personally reviewed the most recent lab results:    Sodium   Date Value Ref Range Status   10/18/2020 139 132 - 146 mmol/L Final   08/31/2019 138 132 - 146 mmol/L Final   08/30/2019 139 132 - 146 mmol/L Final     Potassium   Date Value Ref Range Status   10/18/2020 4.2 3.5 - 5.0 mmol/L Final   08/31/2019 4.0 3.5 - 5.0 mmol/L Final   08/30/2019 3.2 (L) 3.5 - 5.0 mmol/L Final     Chloride   Date Value Ref Range Status   10/18/2020 104 98 - 107 mmol/L Final   08/31/2019 103 98 - 107 mmol/L Final   08/30/2019 103 98 - 107 mmol/L Final     CO2   Date Value Ref Range Status   10/18/2020 25 22 - 29 mmol/L Final   08/31/2019 25 22 - 29 mmol/L Final   08/30/2019 25 22 - 29 mmol/L Final     BUN   Date Value Ref Range Status   10/18/2020 9 6 - 20 mg/dL Final   08/31/2019 16 6 - 20 mg/dL Final   08/30/2019 8 6 - 20 mg/dL Final     CREATININE   Date Value Ref Range Status   10/18/2020 0.8 0.7 - 1.2 mg/dL Final   08/31/2019 0.7 0.7 - 1.2 mg/dL Final   08/30/2019 0.6 (L) 0.7 - 1.2 mg/dL Final     GFR Non-   Date Value Ref Range Status   10/18/2020 >60 >=60 mL/min/1.73 Final     Comment:     Chronic Kidney Disease: less than 60 ml/min/1.73 sq.m. Kidney Failure: less than 15 ml/min/1.73 sq.m. Results valid for patients 18 years and older. 08/31/2019 >60 >=60 mL/min/1.73 Final     Comment:     Chronic Kidney Disease: less than 60 ml/min/1.73 sq.m. Kidney Failure: less than 15 ml/min/1.73 sq.m. Results valid for patients 18 years and older. 08/30/2019 >60 >=60 mL/min/1.73 Final     Comment:     Chronic Kidney Disease: less than 60 ml/min/1.73 sq.m. Kidney Failure: less than 15 ml/min/1.73 sq.m. Results valid for patients 18 years and older.        Calcium   Date Value Ref Range Status   10/18/2020 9.3 8.6 - 10.2 mg/dL Final   08/31/2019 8.7 8.6 - 10.2 mg/dL Final   08/30/2019 8.4 (L) 8.6 - 10.2 mg/dL Final     Phosphorus   Date Value Ref Range Status   08/31/2019 4.3 2.5 - 4.5 mg/dL Final   08/30/2019 3.3 2.5 - 4.5 mg/dL Final   08/29/2019 2.9 2.5 - 4.5 mg/dL Final     Magnesium   Date Value Ref Range Status   08/31/2019 2.0 1.6 - 2.6 mg/dL Final   08/30/2019 2.0 1.6 - 2.6 mg/dL Final   08/29/2019 1.6 1.6 - 2.6 mg/dL Final     WBC   Date Value Ref Range Status   08/31/2019 7.4 4.5 - 11.5 E9/L Final   08/30/2019 9.6 4.5 - 11.5 E9/L Final   08/29/2019 10.6 4.5 - 11.5 E9/L Final     Hemoglobin   Date Value Ref Range Status   08/31/2019 13.8 12.5 - 16.5 g/dL Final   08/30/2019 14.4 12.5 - 16.5 g/dL Final   08/29/2019 13.3 12.5 - 16.5 g/dL Final     Hematocrit   Date Value Ref Range Status   08/31/2019 40.9 37.0 - 54.0 % Final   08/30/2019 41.8 37.0 - 54.0 % Final   08/29/2019 38.0 37.0 - 54.0 % Final     Platelets   Date Value Ref Range Status   08/31/2019 193 130 - 450 E9/L Final   08/30/2019 252 130 - 450 E9/L Final   08/29/2019 223 130 - 450 E9/L Final     Neutrophils %   Date Value Ref Range Status   08/28/2019 78.0 43.0 - 80.0 % Final     Monocytes %   Date Value Ref Range Status   08/28/2019 7.0 2.0 - 12.0 % Final     Total Protein   Date Value Ref Range Status   08/31/2019 6.0 (L) 6.4 - 8.3 g/dL Final   08/30/2019 5.8 (L) 6.4 - 8.3 g/dL Final   08/28/2019 7.7 6.4 - 8.3 g/dL Final     Total Bilirubin   Date Value Ref Range Status   08/31/2019 <0.2 0.0 - 1.2 mg/dL Final   08/30/2019 <0.2 0.0 - 1.2 mg/dL Final   08/28/2019 0.2 0.0 - 1.2 mg/dL Final     Alkaline Phosphatase   Date Value Ref Range Status   08/31/2019 102 40 - 129 U/L Final   08/30/2019 94 40 - 129 U/L Final   08/28/2019 227 (H) 40 - 129 U/L Final     ALT   Date Value Ref Range Status   08/31/2019 30 0 - 40 U/L Final   08/30/2019 21 0 - 40 U/L Final   08/28/2019 20 0 - 40 U/L Final     AST   Date Value Ref Range Status   08/31/2019 46 (H) 0 - 39 U/L Final   08/30/2019 25 0 - 39 U/L Final   08/28/2019 16 0 - 39 U/L Final     Comment:     Specimen is slightly Hemolyzed. Result may be artificially increased. No results found for: PTT, INR  No results found for: CHOLTOT, TRIG, HDL  No components found for: HGBA1C  No results found for: PROTEINCSF, GLUCCSF, WBCCSF    Controlled Substance Monitoring:    Acute and Chronic Pain Monitoring:   No flowsheet data found. MEDICAL DECISION MAKING  ASSESSMENT/PLAN    Arie Yolie was seen today for extremity weakness. 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     Type 1 diabetes    Hypothyroidism    -     EMG; Future  -     External Referral To Physical Therapy    · Injury to the radial nerve is palpable most likely secondary to patient being fell asleep causing neuropraxia injury. · Check EMG left upper extremity nerve conduction study to look for any acute denervation. · Recommend optimal control of underlying diabetes and hypothyroidism. Patient's with diabetes and hypothyroidism are at high risk of entrapment injuries compared to the general population. · Conservative management with physical therapy and wrist splint. · Check hemoglobin A1c and TSH levels. Follow-up in 8 weeks. Thank you for involving me in the care of your patient. Today, I personally spent > 45 mins directly face-to-face time with the patient, of which greater than 50% was spent in patient education, counseling,about etiology management and diagnosis of radial nerve palsy and spiral groove, wrist drop. Side effects of medications  were discussed in detail with the patient, verbalizes understanding and agrees to it. And coordination of care as described above. Patient's current medication list, allergies, problem list and results of all previously ordered testing and scans were reviewed at today's visit.       MD NUHA Springer [facility] - [facility] Neurology  [address] [place] [address]

## 2020-10-21 ENCOUNTER — TELEPHONE (OUTPATIENT)
Dept: NEUROLOGY | Age: 30
End: 2020-10-21

## 2020-10-23 ENCOUNTER — TELEPHONE (OUTPATIENT)
Dept: ENDOCRINOLOGY | Age: 30
End: 2020-10-23

## 2020-10-23 NOTE — TELEPHONE ENCOUNTER
I Haven't seen this pt for almost a year.  Please add him to my schedule in early November to discuss recent labs

## 2020-10-27 ENCOUNTER — OFFICE VISIT (OUTPATIENT)
Dept: NEUROLOGY | Age: 30
End: 2020-10-27
Payer: COMMERCIAL

## 2020-10-27 VITALS
WEIGHT: 160 LBS | TEMPERATURE: 97.2 F | BODY MASS INDEX: 22.9 KG/M2 | DIASTOLIC BLOOD PRESSURE: 80 MMHG | SYSTOLIC BLOOD PRESSURE: 110 MMHG | HEIGHT: 70 IN

## 2020-10-27 PROCEDURE — 95886 MUSC TEST DONE W/N TEST COMP: CPT | Performed by: PSYCHIATRY & NEUROLOGY

## 2020-10-27 PROCEDURE — 95909 NRV CNDJ TST 5-6 STUDIES: CPT | Performed by: PSYCHIATRY & NEUROLOGY

## 2020-10-27 NOTE — PROGRESS NOTES
[address] [facility]  Electrodiagnostic Laboratory  *Accredited by the [facility] with exemplary status  [address]  Phone: [phone]  Fax: [phone]    Referring Provider: Nilson Silva MD  Primary Care Physician: No primary care provider on file. Patient Name: [facility]  Patient YOB: 1990  Gender: male  BMI: Body mass index is 22.96 kg/m². Blood pressure 110/80, temperature 97.2 °F (36.2 °C), height 5' 10\" (1.778 m), weight 160 lb (72.6 kg). 10/27/2020    Description of clinical problem:   Chief Complaint   Patient presents with    Procedure     EMG     Pain No   ; Numbness/tingling  No; Weakness  Yes       Brief physical exam:   Sensory deficit yes; Weakness Yes; Atrophy  No; Reflex abnormality Yes  Motor NCS      Nerve / Sites Latency Amplitude Amp. 1-2 Distance Lat Diff Velocity Temp.    ms mV % cm ms m/s °C   L Median - APB      Wrist 3.02 12.2 100 8   32.1      Elbow 7.76 12.1 99.1 24 4.74 51 32.1   L Ulnar - ADM      Wrist 2.81 13.7 100 8   32.2      B. Elbow 6.88 13.3 97.1 21 4.06 52 32.2      A. Elbow 8.80 13.1 96 10 1.93 52 32.2   L Radial - EIP      Forearm 1.30 3.2 100 6   32.5      Elbow 2.29 2.6 80.4 6 0.99 61 32.5      Spiral Gr 6.04 0.8 25.4 12 3.75 32 32.5       Sensory NCS      Nerve / Sites Onset Lat Peak Lat PP Amp Amp. 1-2 Distance Velocity Temp.    ms ms µV % cm m/s °C   L Median - Digit II (Antidromic)      Mid Palm 1.09 1.82 74.0 100 7 64 32.2      Wrist 2.40 3.28 50.1 85.6 14 58 32.2   L Ulnar - Digit V (Antidromic)      Wrist 2.24 3.28 37.0 100 14 63 32.2   L Radial - Anatomical  (Forearm)      Forearm 1.56 2.08 39.8 100 10 64 32.1     F  Wave      Nerve F Lat M Lat F-M Lat    ms ms ms   L Median - APB 28.4 3.0 25.4   L Ulnar - ADM 29.4 2.2 27.1       EMG         EMG Summary Table     Spontaneous MUAP Recruitment   Muscle IA Fib PSW Fasc H.F. Amp Dur. PPP Pattern   L.  First dorsal interosseous Normal None None None None Normal

## 2021-02-02 ENCOUNTER — OFFICE VISIT (OUTPATIENT)
Dept: ENDOCRINOLOGY | Age: 31
End: 2021-02-02
Payer: COMMERCIAL

## 2021-02-02 VITALS
BODY MASS INDEX: 23.96 KG/M2 | HEART RATE: 81 BPM | DIASTOLIC BLOOD PRESSURE: 72 MMHG | WEIGHT: 167 LBS | SYSTOLIC BLOOD PRESSURE: 124 MMHG | OXYGEN SATURATION: 98 % | TEMPERATURE: 93.4 F

## 2021-02-02 DIAGNOSIS — E10.9 TYPE 1 DIABETES MELLITUS WITHOUT COMPLICATION (HCC): Primary | ICD-10-CM

## 2021-02-02 DIAGNOSIS — E03.9 HYPOTHYROIDISM, UNSPECIFIED TYPE: ICD-10-CM

## 2021-02-02 DIAGNOSIS — E55.9 VITAMIN D DEFICIENCY: ICD-10-CM

## 2021-02-02 LAB — HBA1C MFR BLD: 8 %

## 2021-02-02 PROCEDURE — 99214 OFFICE O/P EST MOD 30 MIN: CPT | Performed by: INTERNAL MEDICINE

## 2021-02-02 PROCEDURE — G8420 CALC BMI NORM PARAMETERS: HCPCS | Performed by: INTERNAL MEDICINE

## 2021-02-02 PROCEDURE — G8427 DOCREV CUR MEDS BY ELIG CLIN: HCPCS | Performed by: INTERNAL MEDICINE

## 2021-02-02 PROCEDURE — 2022F DILAT RTA XM EVC RTNOPTHY: CPT | Performed by: INTERNAL MEDICINE

## 2021-02-02 PROCEDURE — G8484 FLU IMMUNIZE NO ADMIN: HCPCS | Performed by: INTERNAL MEDICINE

## 2021-02-02 PROCEDURE — 3052F HG A1C>EQUAL 8.0%<EQUAL 9.0%: CPT | Performed by: INTERNAL MEDICINE

## 2021-02-02 PROCEDURE — 4004F PT TOBACCO SCREEN RCVD TLK: CPT | Performed by: INTERNAL MEDICINE

## 2021-02-02 PROCEDURE — 83036 HEMOGLOBIN GLYCOSYLATED A1C: CPT | Performed by: INTERNAL MEDICINE

## 2021-02-02 NOTE — PROGRESS NOTES
[address] [place] [address] Department of Endocrinology Diabetes and Metabolism   [address] [facility] [address]   Phone: [phone]  Fax: [phone]    Date of Service: 2/2/2021    Primary Care Physician: No primary care provider on file. Provider: Jacinta Strong MD     Reason for the visit:  DM type 1     History of Present Illness: The history is provided by the patient. No  was used. Accuracy of the patient data is excellent. Vanesa Mejia is a very pleasant 31 y.o. male seen in Endocrine clinic today for diabetes management     Vanesa Mejia was diagnosed with diabetes in 8/2019. He was admitted to the hospital on 8/28/2019 because nausea, vomiting and generalized weakness and found to in DKA with marked hyperglycemia BS of 1190  Currently on Medtronic insulin pump with following settings, Basal rate 0.6, CR 15, ISF 49, Goal 100-120, 3 hrs   The patient wasn't checking BS at all and wasn't entering carbs   Most recent A1c results summarized below  Lab Results   Component Value Date    LABA1C 8.0 02/02/2021    LABA1C 7.7 10/20/2020    LABA1C 11.4 01/16/2020     The patient hasn't been mindful of what has been eating and wasn't following diabetes diet as encouraged. unfortunately pt currently doesn't have medical insurance and  I reviewed current medications and the patient has no issues with diabetes medications  Vanesa Mejia is up to date with eye exam and denied any history of diabetic retinopathy. Because of marked hyperglycemia at the diagnosis he still c/o blurry vision which will likely take few more weeks to improve   The patient performs his own feet care  Microvascular complications:  No Retinopathy, Nephropathy or Neuropathy   Macrovascular complications: no CAD, PVD, or Stroke    Primary hypothyroidism   On levothyroxine 25 mcg daily.  Patient takes levothyroxine in the morning at empty stomach, wait one hour before eating , avoid multivitamins containing calcium FREEDOM LITE) w/Device KIT To test 5x/day 1 kit 0     No current facility-administered medications for this visit. Review of Systems  Constitutional: No fever, no chills, no diaphoresis, no generalized weakness. HEENT: No blurred vision, No sore throat, no ear pain, no hair loss  Neck: denied any neck swelling, difficulty swallowing,   Cardio-pulmonary: No CP, SOB or palpitation, No orthopnea or PND. No cough or wheezing. GI: No N/V/D, no constipation, No abdominal pain, no melena or hematochezia   : Denied any dysuria, hematuria, flank pain, discharge, or incontinence. Skin: denied any rash, ulcer, Hirsute, or hyperpigmentation. MSK: denied any joint deformity, joint pain/swelling, muscle pain, or back pain. Neuro: no numbness, no tingling, no weakness, _    OBJECTIVE    /72   Pulse 81   Temp 93.4 °F (34.1 °C)   Wt 167 lb (75.8 kg)   SpO2 98%   BMI 23.96 kg/m²   BP Readings from Last 4 Encounters:   02/02/21 124/72   10/27/20 110/80   10/20/20 130/82   10/18/20 128/65     Wt Readings from Last 6 Encounters:   02/02/21 167 lb (75.8 kg)   10/27/20 160 lb (72.6 kg)   10/20/20 160 lb (72.6 kg)   10/18/20 151 lb (68.5 kg)   01/16/20 151 lb 9.6 oz (68.8 kg)   10/28/19 150 lb (68 kg)     Physical examination:  General: awake alert, oriented x3, no abnormal position or movements. HEENT: normocephalic non-traumatic, no exophthalmos   Neck: supple, no LN enlargement, no thyromegaly, no thyroid tenderness, no JVD. Pulm: Clear equal air entry no added sounds, no wheezing or rhonchi    CVS: S1 + S2, no murmur, no heave. Dorsalis pedis pulse palpable   Abd: soft lax, no tenderness, no organomegaly, audible bowel sounds. Skin: warm, no lesions, no rash.  Mild callus, no Ulcers, No acanthosis nigricans  Musculoskeletal: No back tenderness, no kyphosis/scoliosis    Neuro: CN intact, Monofilament sensation present bilateral , muscle power normal  Psych: normal mood, and affect    Review of Laboratory Data:  I personally reviewed the following lab:  Lab Results   Component Value Date/Time    WBC 7.4 08/31/2019 05:20 AM    RBC 4.27 08/31/2019 05:20 AM    HGB 13.8 08/31/2019 05:20 AM    HCT 40.9 08/31/2019 05:20 AM    MCV 95.8 08/31/2019 05:20 AM    MCH 32.3 08/31/2019 05:20 AM    MCHC 33.7 08/31/2019 05:20 AM    RDW 12.6 08/31/2019 05:20 AM     08/31/2019 05:20 AM    MPV 10.2 08/31/2019 05:20 AM      Lab Results   Component Value Date/Time     10/20/2020 12:25 PM    K 4.5 10/20/2020 12:25 PM    CO2 27 10/20/2020 12:25 PM    BUN 10 10/20/2020 12:25 PM    CREATININE 0.9 10/20/2020 12:25 PM    CALCIUM 9.7 10/20/2020 12:25 PM    LABGLOM >60 10/20/2020 12:25 PM    GFRAA >60 10/20/2020 12:25 PM      Lab Results   Component Value Date/Time    TSH 4.100 10/20/2020 12:25 PM    T4FREE 1.18 10/20/2020 12:25 PM    V1EHOGY 4.6 08/30/2019 06:30 AM     Lab Results   Component Value Date    LABA1C 7.7 10/20/2020    GLUCOSE 303 10/20/2020    MALBCR 13.1 10/20/2020    LABMICR 13.9 10/20/2020    LABCREA 106 10/20/2020     Lab Results   Component Value Date    LABA1C 7.7 10/20/2020    LABA1C 11.4 01/16/2020    LABA1C >16.5 08/30/2019     No results found for: CHOL, TRIG, HDL  Lab Results   Component Value Date    VITD25 22 10/20/2020     ASSESSMENT & RECOMMENDATIONS   Ro Hutton, a 31 y.o.-old male seen in for the following issues     Diabetes Mellitus Type 1     · Patient's diabetes is uncontrol  Due to poor compliance with DM diet and insulin pump therapy   · I have reviewed pump download and pt wasn't checking BS at all and wasn't entering carbs   · I had a long discussion with pt and explained to him that in orer to continue pump therapy he has to check BS 4 times a day and enter carbs in the pump   · The patient promised to start checking blood sugars 4 times a day before meals and at bedtime and bring pump for download in a week or two   · Discussed with patient A1c and blood sugar goals   · Patient will need routine diabetes maintenance and prevention  · Discussed lifestyle changes including diet and exercise with patient; recommended 150 minutes of moderate intensity exercise per week. · Diabetes labs before next visit     Primary Hypothyroidism   · Currently on levothyroxine 25 mcg daily  · Check TFT before next visit     VitD deficiency   · Continue vitD supplement   · Check vitD     I personally reviewed external notes from other patient's care team providers, and personally interpreted labs associated with the above diagnosis. I also ordered labs to further assess and manage the above addressed medical conditions    Return in about 4 months (around 6/2/2021) for DM type 1, Hypothyroidism . The above issues were reviewed with the patient who understood and agreed with the plan. Thank you for allowing us to participate in the care of this patient. Please do not hesitate to contact us with any additional questions. Diagnosis Orders   1. Type 1 diabetes mellitus without complication (HCC)  Hemoglobin D5W    Basic Metabolic Panel    Microalbumin / Creatinine Urine Ratio    Vitamin D 25 Hydroxy    TSH without Reflex    T4, Free    POCT glycosylated hemoglobin (Hb A1C)   2. Hypothyroidism, unspecified type  TSH without Reflex    T4, Free   3. Vitamin D deficiency  Vitamin D 25 Hydroxy       Joshua Senior MD  Endocrinologist, [facility] - BE[facility]AVIORAL HEALTH SERVICES Diabetes Care and Endocrinology   [address] [facility] [address]   Phone: [phone]  Fax: [phone]  --------------------------------------------  An electronic signature was used to authenticate this note.  Abraham King MD on 2/2/2021 at 9:20 AM

## 2021-07-02 DIAGNOSIS — E10.9 TYPE 1 DIABETES MELLITUS WITHOUT COMPLICATION (HCC): ICD-10-CM

## 2021-07-06 RX ORDER — BLOOD-GLUCOSE METER
KIT MISCELLANEOUS
Qty: 150 STRIP | Refills: 5 | Status: SHIPPED
Start: 2021-07-06 | End: 2021-12-01 | Stop reason: CLARIF

## 2021-08-30 DIAGNOSIS — E10.9 TYPE 1 DIABETES MELLITUS WITHOUT COMPLICATION (HCC): ICD-10-CM

## 2021-12-01 DIAGNOSIS — E10.9 TYPE 1 DIABETES MELLITUS WITHOUT COMPLICATION (HCC): Primary | ICD-10-CM

## 2021-12-01 RX ORDER — BLOOD SUGAR DIAGNOSTIC
1 STRIP MISCELLANEOUS 4 TIMES DAILY
Qty: 150 EACH | Refills: 11 | Status: SHIPPED
Start: 2021-12-01 | End: 2022-01-12 | Stop reason: SDUPTHER

## 2021-12-01 RX ORDER — LANCETS 33 GAUGE
EACH MISCELLANEOUS
Qty: 200 EACH | Refills: 11 | Status: SHIPPED | OUTPATIENT
Start: 2021-12-01

## 2021-12-01 RX ORDER — BLOOD-GLUCOSE METER
EACH MISCELLANEOUS
Qty: 1 KIT | Refills: 0 | Status: SHIPPED | OUTPATIENT
Start: 2021-12-01

## 2022-01-12 ENCOUNTER — OFFICE VISIT (OUTPATIENT)
Dept: ENDOCRINOLOGY | Age: 32
End: 2022-01-12
Payer: COMMERCIAL

## 2022-01-12 VITALS
BODY MASS INDEX: 24.39 KG/M2 | DIASTOLIC BLOOD PRESSURE: 70 MMHG | HEIGHT: 70 IN | HEART RATE: 74 BPM | SYSTOLIC BLOOD PRESSURE: 116 MMHG | WEIGHT: 170.4 LBS | RESPIRATION RATE: 16 BRPM

## 2022-01-12 DIAGNOSIS — E10.9 TYPE 1 DIABETES MELLITUS WITHOUT COMPLICATION (HCC): Primary | ICD-10-CM

## 2022-01-12 DIAGNOSIS — E03.9 HYPOTHYROIDISM, UNSPECIFIED TYPE: ICD-10-CM

## 2022-01-12 DIAGNOSIS — E55.9 VITAMIN D DEFICIENCY: ICD-10-CM

## 2022-01-12 LAB — HBA1C MFR BLD: 7.6 %

## 2022-01-12 PROCEDURE — 3051F HG A1C>EQUAL 7.0%<8.0%: CPT | Performed by: INTERNAL MEDICINE

## 2022-01-12 PROCEDURE — G8484 FLU IMMUNIZE NO ADMIN: HCPCS | Performed by: INTERNAL MEDICINE

## 2022-01-12 PROCEDURE — 99214 OFFICE O/P EST MOD 30 MIN: CPT | Performed by: INTERNAL MEDICINE

## 2022-01-12 PROCEDURE — 4004F PT TOBACCO SCREEN RCVD TLK: CPT | Performed by: INTERNAL MEDICINE

## 2022-01-12 PROCEDURE — 83036 HEMOGLOBIN GLYCOSYLATED A1C: CPT | Performed by: INTERNAL MEDICINE

## 2022-01-12 PROCEDURE — 2022F DILAT RTA XM EVC RTNOPTHY: CPT | Performed by: INTERNAL MEDICINE

## 2022-01-12 PROCEDURE — G8427 DOCREV CUR MEDS BY ELIG CLIN: HCPCS | Performed by: INTERNAL MEDICINE

## 2022-01-12 PROCEDURE — G8420 CALC BMI NORM PARAMETERS: HCPCS | Performed by: INTERNAL MEDICINE

## 2022-01-12 RX ORDER — BLOOD SUGAR DIAGNOSTIC
1 STRIP MISCELLANEOUS 4 TIMES DAILY
Qty: 250 EACH | Refills: 3 | Status: SHIPPED | OUTPATIENT
Start: 2022-01-12

## 2022-01-12 NOTE — PROGRESS NOTES
[address] [place] [address] Department of Endocrinology Diabetes and Metabolism   [address] [facility] [address]   Phone: [phone]  Fax: [phone]    Date of Service: 1/12/2022    Primary Care Physician: No primary care provider on file. Provider: Vicco Market MD     Reason for the visit:  DM type 1     History of Present Illness: The history is provided by the patient. No  was used. Accuracy of the patient data is excellent. Tanvi Koch is a very pleasant 27 y.o. male seen in Endocrine clinic today for diabetes management     Tanvi Koch was diagnosed with diabetes in 8/2019. He was admitted to the hospital on 8/28/2019 because nausea, vomiting and generalized weakness and found to in DKA with marked hyperglycemia BS of 1190  Currently on Medtronic insulin pump with following settings, Basal rate 0.6, CR 15, ISF 49, Goal 100-120, 3 hrs   The patient wasn't checking BS    Most recent A1c results summarized below  Lab Results   Component Value Date    LABA1C 7.6 01/12/2022    LABA1C 8.0 02/02/2021    LABA1C 7.7 10/20/2020     The patient hasn't been mindful of what has been eating and wasn't following diabetes diet as encouraged. unfortunately pt currently doesn't have medical insurance and  I reviewed current medications and the patient has no issues with diabetes medications  Tanvi Koch is up to date with eye exam and denied any history of diabetic retinopathy. Because of marked hyperglycemia at the diagnosis he still c/o blurry vision which will likely take few more weeks to improve   The patient performs his own feet care  Microvascular complications:  No Retinopathy, Nephropathy or Neuropathy   Macrovascular complications: no CAD, PVD, or Stroke    Primary hypothyroidism   On levothyroxine 25 mcg daily.  Patient takes levothyroxine in the morning at empty stomach, wait one hour before eating , avoid multivitamins containing calcium  or iron with it.  Levothyroxine was started during recent hospitalization few weeks ago   Lab Results   Component Value Date/Time    TSH 4.100 10/20/2020 12:25 PM    T4FREE 1.18 10/20/2020 12:25 PM    I0UADAT 4.6 08/30/2019 06:30 AM       PAST MEDICAL HISTORY   Past Medical History:   Diagnosis Date    DKA (diabetic ketoacidoses)     Hyperkalemia 8/28/2019    Leukocytosis 7/14/5100    Metabolic acid[facility] due to diabetes mellitus ([facility]     New onset type 1 diabetes mellitus, uncontrolled ([facility] 8/28/2019    Subclinical hypothyroidism 8/28/2019    Tobacco abuse 8/28/2019    Vertigo 2012     PAST SURGICAL HISTORY   No past surgical history on file. SOCIAL HISTORY   Tobacco:   reports that he has been smoking cigarettes. He started smoking about 15 years ago. He has been smoking about 1.00 pack per day. He has quit using smokeless tobacco.  Alcol:   reports current alcohol use. Illicit Drugs:   reports current drug use. Drug: Marijuana Gretel Peals). FAMILY HISTORY   Family History   Problem Relation Age of Onset    Thyroid Disease Mother         Age 58, 65    Thyroid Disease Sister     Diabetes Father         Age 58; 2019; type 2 diabetes     ALLERGIES AND DRUG REACTIONS   No Known Allergies    CURRENT MEDICATIONS   Current Outpatient Medications   Medication Sig Dispense Refill    insulin lispro (HUMALOG) 100 UNIT/ML injection vial Via insulin pump. 75 units/day 60 mL 1    blood glucose test strips (ONETOUCH VERIO) strip 1 each by In Vitro route 4 times daily As needed. 250 each 3    Blood Glucose Monitoring Suppl (ONETOUCH VERIO) w/Device KIT To test 4x/day 1 kit 0    Lancets (ONETOUCH DELICA PLUS TMYPCM20M) MISC To test 4x/day 200 each 11    levothyroxine (SYNTHROID) 25 MCG tablet Take 1 tablet by mouth Daily 90 tablet 3    Insulin Syringe-Needle U-100 (INSULIN SYRINGE .3CC/29GX1/2\") 29G X 1/2\" 0.3 ML MISC To inject 4x/day 400 each 3     No current facility-administered medications for this visit.        Review of Systems  Constitutional: No fever, no chills, no diaphoresis, no generalized weakness. HEENT: No blurred vision, No sore throat, no ear pain, no hair loss  Neck: denied any neck swelling, difficulty swallowing,   Cardio-pulmonary: No CP, SOB or palpitation, No orthopnea or PND. No cough or wheezing. GI: No N/V/D, no constipation, No abdominal pain, no melena or hematochezia   : Denied any dysuria, hematuria, flank pain, discharge, or incontinence. Skin: denied any rash, ulcer, Hirsute, or hyperpigmentation. MSK: denied any joint deformity, joint pain/swelling, muscle pain, or back pain. Neuro: no numbness, no tingling, no weakness, _    OBJECTIVE    /70   Pulse 74   Resp 16   Ht 5' 10\" (1.778 m)   Wt 170 lb 6.4 oz (77.3 kg)   BMI 24.45 kg/m²   BP Readings from Last 4 Encounters:   01/12/22 116/70   02/02/21 124/72   10/27/20 110/80   10/20/20 130/82     Wt Readings from Last 6 Encounters:   01/12/22 170 lb 6.4 oz (77.3 kg)   02/02/21 167 lb (75.8 kg)   10/27/20 160 lb (72.6 kg)   10/20/20 160 lb (72.6 kg)   10/18/20 151 lb (68.5 kg)   01/16/20 151 lb 9.6 oz (68.8 kg)     Physical examination:  General: awake alert, oriented x3, no abnormal position or movements. HEENT: normocephalic non-traumatic, no exophthalmos   Neck: supple, no LN enlargement, no thyromegaly, no thyroid tenderness, no JVD. Pulm: Clear equal air entry no added sounds, no wheezing or rhonchi    CVS: S1 + S2, no murmur, no heave. Dorsalis pedis pulse palpable   Abd: soft lax, no tenderness, no organomegaly, audible bowel sounds. Skin: warm, no lesions, no rash.  Mild callus, no Ulcers, No acanthosis nigricans  Musculoskeletal: No back tenderness, no kyphosis/scoliosis    Neuro: CN intact, Monofilament sensation present bilateral , muscle power normal  Psych: normal mood, and affect    Review of Laboratory Data:  I personally reviewed the following lab:  Lab Results   Component Value Date/Time    WBC 7.4 08/31/2019 05:20 AM RBC 4.27 08/31/2019 05:20 AM    HGB 13.8 08/31/2019 05:20 AM    HCT 40.9 08/31/2019 05:20 AM    MCV 95.8 08/31/2019 05:20 AM    MCH 32.3 08/31/2019 05:20 AM    MCHC 33.7 08/31/2019 05:20 AM    RDW 12.6 08/31/2019 05:20 AM     08/31/2019 05:20 AM    MPV 10.2 08/31/2019 05:20 AM      Lab Results   Component Value Date/Time     10/20/2020 12:25 PM    K 4.5 10/20/2020 12:25 PM    CO2 27 10/20/2020 12:25 PM    BUN 10 10/20/2020 12:25 PM    CREATININE 0.9 10/20/2020 12:25 PM    CALCIUM 9.7 10/20/2020 12:25 PM    LABGLOM >60 10/20/2020 12:25 PM    GFRAA >60 10/20/2020 12:25 PM      Lab Results   Component Value Date/Time    TSH 4.100 10/20/2020 12:25 PM    T4FREE 1.18 10/20/2020 12:25 PM    J1GCCRY 4.6 08/30/2019 06:30 AM     Lab Results   Component Value Date    LABA1C 7.6 01/12/2022    GLUCOSE 303 10/20/2020    MALBCR 13.1 10/20/2020    LABMICR 13.9 10/20/2020    LABCREA 106 10/20/2020     Lab Results   Component Value Date    LABA1C 7.6 01/12/2022    LABA1C 8.0 02/02/2021    LABA1C 7.7 10/20/2020     No results found for: CHOL, TRIG, HDL, LDL  Lab Results   Component Value Date    VITD25 22 10/20/2020     ASSESSMENT & RECOMMENDATIONS   Sydni Naima, a 27 y.o.-old male seen in for the following issues     Diabetes Mellitus Type 1     · Patient's diabetes is uncontrol   · Unfortunately, I don't have enough readings to adjust pump settings today   · Current pump settings:  Basal rate 0.6, CR 15, ISF 49, Goal 100-120, 3 hrs   · Discussed with patient A1c and blood sugar goals   · Patient will need routine diabetes maintenance and prevention  · Diabetes labs before next visit     Primary Hypothyroidism   · Currently on levothyroxine 25 mcg daily  · Check TFT before next visit     VitD deficiency   · Continue vitD supplement   · Check vitD     I personally reviewed external notes from other patient's care team providers, and personally interpreted labs associated with the above diagnosis.  I also ordered labs to further assess and manage the above addressed medical conditions    Return in about 4 months (around 5/12/2022) for DM type 1, VitD deficiency. The above issues were reviewed with the patient who understood and agreed with the plan. Thank you for allowing us to participate in the care of this patient. Please do not hesitate to contact us with any additional questions. Diagnosis Orders   1. Type 1 diabetes mellitus without complication (HCC)  POCT glycosylated hemoglobin (Hb A1C)    insulin lispro (HUMALOG) 100 UNIT/ML injection vial    blood glucose test strips (ONETOUCH VERIO) strip    Microalbumin / Creatinine Urine Ratio   2. Hypothyroidism, unspecified type  TSH without Reflex    T4, Free   3. Vitamin D deficiency  Vitamin D 25 Hydroxy    Basic Metabolic Panel       Maribeth Lewis MD  Endocrinologist, [facility] Endocrinology   [address] [place] [id number]   Phone: [phone]  Fax: [phone]  --------------------------------------------  An electronic signature was used to authenticate this note.  Dale Phelps MD on 1/12/2022 at 2:02 PM

## 2022-03-13 DIAGNOSIS — E10.9 TYPE 1 DIABETES MELLITUS WITHOUT COMPLICATION (HCC): ICD-10-CM

## 2022-10-01 DIAGNOSIS — E10.9 TYPE 1 DIABETES MELLITUS WITHOUT COMPLICATION (HCC): ICD-10-CM

## 2022-10-06 RX ORDER — INSULIN LISPRO 100 [IU]/ML
INJECTION, SOLUTION INTRAVENOUS; SUBCUTANEOUS
Qty: 30 ML | Refills: 0 | Status: SHIPPED | OUTPATIENT
Start: 2022-10-06

## 2022-12-06 DIAGNOSIS — E10.9 TYPE 1 DIABETES MELLITUS WITHOUT COMPLICATION (HCC): ICD-10-CM

## 2022-12-08 RX ORDER — INSULIN LISPRO 100 [IU]/ML
INJECTION, SOLUTION INTRAVENOUS; SUBCUTANEOUS
Qty: 20 ML | Refills: 0 | Status: SHIPPED | OUTPATIENT
Start: 2022-12-08

## 2023-01-08 DIAGNOSIS — E10.9 TYPE 1 DIABETES MELLITUS WITHOUT COMPLICATION (HCC): ICD-10-CM

## 2023-01-11 RX ORDER — INSULIN LISPRO 100 [IU]/ML
INJECTION, SOLUTION INTRAVENOUS; SUBCUTANEOUS
Qty: 30 ML | Refills: 0 | Status: SHIPPED | OUTPATIENT
Start: 2023-01-11

## 2023-01-20 ENCOUNTER — HOSPITAL ENCOUNTER (EMERGENCY)
Age: 33
Discharge: HOME | End: 2023-01-20
Payer: COMMERCIAL

## 2023-01-20 ENCOUNTER — APPOINTMENT (OUTPATIENT)
Dept: GENERAL RADIOLOGY | Age: 33
End: 2023-01-20
Payer: COMMERCIAL

## 2023-01-20 VITALS
BODY MASS INDEX: 23.62 KG/M2 | WEIGHT: 165 LBS | OXYGEN SATURATION: 98 % | SYSTOLIC BLOOD PRESSURE: 129 MMHG | TEMPERATURE: 98.4 F | HEIGHT: 70 IN | RESPIRATION RATE: 16 BRPM | DIASTOLIC BLOOD PRESSURE: 91 MMHG | HEART RATE: 87 BPM

## 2023-01-20 DIAGNOSIS — M79.671 ACUTE FOOT PAIN, RIGHT: Primary | ICD-10-CM

## 2023-01-20 PROCEDURE — 99283 EMERGENCY DEPT VISIT LOW MDM: CPT

## 2023-01-20 PROCEDURE — 73630 X-RAY EXAM OF FOOT: CPT

## 2023-01-20 RX ORDER — NAPROXEN 500 MG/1
500 TABLET ORAL 2 TIMES DAILY WITH MEALS
Qty: 10 TABLET | Refills: 0 | Status: SHIPPED | OUTPATIENT
Start: 2023-01-20 | End: 2023-01-25

## 2023-01-20 ASSESSMENT — PAIN DESCRIPTION - ORIENTATION: ORIENTATION: RIGHT

## 2023-01-20 ASSESSMENT — PAIN - FUNCTIONAL ASSESSMENT: PAIN_FUNCTIONAL_ASSESSMENT: 0-10

## 2023-01-20 ASSESSMENT — PAIN SCALES - GENERAL: PAINLEVEL_OUTOF10: 4

## 2023-01-20 ASSESSMENT — PAIN DESCRIPTION - LOCATION: LOCATION: FOOT

## 2023-01-20 NOTE — ED PROVIDER NOTES
Independent DINAH Visit. [facility]  Department of Emergency Medicine   ED  Encounter Note  Admit Date/RoomTime: 2023  5:25 PM  ED Room: /    NAME: Danieal Parker  : 1990  MRN: [id number]     Chief Complaint:  Foot Pain (right foot pain and swelling since tuesday, denies injury / hx type 1 DM, denies wounds )    History of Present Illness         Daniela Parker is a 34 y.o. old male presenting to the emergency department by private vehicle, for non-traumatic Right foot pain which occured 4 day(s) prior to arrival.  The complaint is due to no known cause. Patient has no prior history of pain/injury with regards to today's visit. Since onset the symptoms have been worsening until today where they have gradually improved with ability to bear weight, but with some pain. His pain is aggraveated by extending of toes and relieved by rest of injured area. He denies any head injury, headache, loss of consciousness, neck pain, chest pain, abdominal pain, back pain, numbness, weakness, blurred vision, nausea, vomiting, fever, chills, wounds, or rash. Patient reports that he is a type I diabetic and therefore is presenting for evaluation. Patient denies any significant past medical history of diabetic ulcer or diabetic wound. Tetanus Status: up to date. ROS   Pertinent positives and negatives are stated within HPI, all other systems reviewed and are negative. Past Medical History:  has a past medical history of DKA (diabetic ketoacidoses), Hyperkalemia, Leukocytosis, Metabolic acidosis due to diabetes mellitus ([address] New onset type 1 diabetes mellitus, uncontrolled, Subclinical hypothyroidism, Tobacco abuse, and Vertigo. Surgical History:  has no past surgical history on file. Social History:  reports that he has been smoking cigarettes. He started smoking about 16 years ago. He has been smoking an average of 1 pack per day.  He has quit using smokeless tobacco. He reports current alcohol use of about 35.0 standard drinks per week. He reports current drug use. Frequency: 7.00 times per week. Drug: Marijuana Sharathe Pavel). Family History: family history includes Diabetes in his father; Thyroid Disease in his mother and sister. Allergies: Patient has no known allergies. Physical Exam   Oxygen Saturation Interpretation: Normal.        ED Triage Vitals [01/20/23 1722]   BP Temp Temp Source Heart Rate Resp SpO2 Height Weight   (!) 129/91 98.4 °F (36.9 °C) Oral 87 16 98 % 5' 10\" (1.778 m) 165 lb (74.8 kg)         Constitutional:  Alert, development consistent with age. Neck:  Normal ROM. Supple. Right Foot: diffusely across entire foot               Tenderness:  mild. Swelling: None. Deformity: no deformity observed/palpated. ROM: full range with pain at dorsal midfoot with extension of toes             Skin:  no erythema, rash or wounds noted. Neurovascular: Motor deficit: none. Sensory deficit:   none. Pulse deficit: none. 2+ dorsalis pedis and posterior tibial pulses intact. Capillary refill: normal.  Warm and well perfused. No signs of DVT. Right Toe(s):  diffusely across all digits. Tenderness: none. Swelling: None. Deformity: no deformity observed/palpated. ROM: full range of motion. Skin:  no wounds, erythema, or swelling. Right Ankle: diffusely across ankle            Tenderness:  none. Swelling: None. Deformity: no deformity observed/palpated. ROM: full range of motion. Skin:  no wounds, erythema, or swelling. Gait:  normal.  Lymphatics: No lymphangitis or adenopathy noted. Neurological:  Oriented. Motor functions intact.     Lab / Imaging Results   (All laboratory and radiology results have been personally reviewed by myself)  Labs:  No results found for this visit on 01/20/23. Imaging: All Radiology results interpreted by Radiologist unless otherwise noted. XR FOOT RIGHT (MIN 3 VIEWS)   Final Result   No acute osseous abnormality. ED Course / Medical Decision Making   Medications - No data to display     Consult(s):   None. Procedure(s):  None    MDM:     Patient is a 78-year-old male who presents to ER for 4 days of right foot pain due to no known injury. Based on reported history and physical exam findings differentials include foot sprain, fracture, right foot pain. Patient has significant past medical history of no previous fractures or injuries and type 1 diabetes. X-ray imaging was obtained on the right foot which demonstrated no acute osseous abnormalities as read by radiology. All results discussed with patient prior to disposition all questions were answered. Patient demonstrates no red flag symptoms at this time that would warrant further evaluation admit to the hospital at this time. Patient was diagnosed with right foot pain which is most likely musculoskeletal in nature and self-limiting. Patient was recommended to initiate RICE. He was offered prescription for naproxen in ED setting to which she stated he would just pick it up at the pharmacy. Patient is to take naproxen 500 mg 1 tablet by mouth twice daily for 5 days. He was given follow-up to podiatry to follow-up with in approximately 3 days. He is alert and oriented, no acute distress, afebrile, nontachycardic and nonhypoxic. He is to return to ER with new or worsening symptoms. Patient is understandable and agreeable to plan. Plan of Care/Counseling:  JOHN Cannon reviewed today's visit with the patient in addition to providing specific details for the plan of care and counseling regarding the diagnosis and prognosis. Questions are answered at this time and are agreeable with the plan. Assessment      1. Acute foot pain, right      Plan   Discharged home.   Patient condition is good    New Medications     New Prescriptions    NAPROXEN (NAPROSYN) 500 MG TABLET    Take 1 tablet by mouth 2 times daily (with meals) for 5 days     Electronically signed by JOHN De La Torre   DD: 1/20/23  **This report was transcribed using voice recognition software. Every effort was made to ensure accuracy; however, inadvertent computerized transcription errors may be present.   END OF ED PROVIDER NOTE      Lonnie De La Torre  01/20/23 1545 Lonnie Cortez  01/20/23 3327

## 2023-01-20 NOTE — Clinical Note
Paytno Medrano was seen and treated in our emergency department on 1/20/2023. He may return to work on 01/21/2023. If you have any questions or concerns, please don't hesitate to call.       JOHN Falcon

## 2023-01-20 NOTE — ED TRIAGE NOTES
Department of Emergency Medicine  FIRST PROVIDER TRIAGE NOTE             Independent MLP           1/20/23  5:24 PM EST    Date of Encounter: 1/20/23   MRN: [id number]      HPI: Olena Gonzales is a 34 y.o. male who presents to the ED for Foot Pain (right foot pain and swelling, denies injury)   Hx dm, denies open sores, reports swelling    ROS: Negative for cp or sob. PE: Gen Appearance/Constitutional: alert  HEENT: NC/NT. PERRLA,  Airway patent. Initial Plan of Care: All treatment areas with department are currently occupied. Plan to order/Initiate the following while awaiting opening in ED: imaging studies.   Initiate Treatment-Testing, Proceed toTreatment Area When Bed Available for ED Attending/MLP to Continue Care    Electronically signed by SIA Conrad CNP   DD: 1/20/23

## 2023-03-21 DIAGNOSIS — E10.9 TYPE 1 DIABETES MELLITUS WITHOUT COMPLICATION (HCC): ICD-10-CM

## 2023-03-22 DIAGNOSIS — E10.9 TYPE 1 DIABETES MELLITUS WITHOUT COMPLICATION (HCC): ICD-10-CM

## 2023-03-22 RX ORDER — BLOOD SUGAR DIAGNOSTIC
STRIP MISCELLANEOUS
Qty: 250 STRIP | Refills: 3 | Status: SHIPPED | OUTPATIENT
Start: 2023-03-22

## 2023-03-22 NOTE — TELEPHONE ENCOUNTER
Pt called for refill on insukin vials. Not see in OVER 1 Year. No future appt.  Pt cannot get refills

## 2023-04-17 DIAGNOSIS — E10.9 TYPE 1 DIABETES MELLITUS WITHOUT COMPLICATION (HCC): ICD-10-CM

## 2023-04-17 RX ORDER — INSULIN LISPRO 100 [IU]/ML
INJECTION, SOLUTION INTRAVENOUS; SUBCUTANEOUS
Qty: 30 ML | Refills: 3 | OUTPATIENT
Start: 2023-04-17

## 2023-05-30 DIAGNOSIS — E10.9 TYPE 1 DIABETES MELLITUS WITHOUT COMPLICATION (HCC): ICD-10-CM

## 2023-05-31 RX ORDER — INSULIN LISPRO 100 [IU]/ML
INJECTION, SOLUTION INTRAVENOUS; SUBCUTANEOUS
Qty: 30 ML | Refills: 0 | OUTPATIENT
Start: 2023-05-31

## 2023-06-16 DIAGNOSIS — E10.9 TYPE 1 DIABETES MELLITUS WITHOUT COMPLICATION (HCC): ICD-10-CM

## 2023-06-19 RX ORDER — INSULIN LISPRO 100 [IU]/ML
INJECTION, SOLUTION INTRAVENOUS; SUBCUTANEOUS
Qty: 30 ML | Refills: 0 | OUTPATIENT
Start: 2023-06-19

## 2023-06-20 DIAGNOSIS — E10.9 TYPE 1 DIABETES MELLITUS WITHOUT COMPLICATION (HCC): ICD-10-CM

## 2023-06-21 RX ORDER — INSULIN LISPRO 100 [IU]/ML
INJECTION, SOLUTION INTRAVENOUS; SUBCUTANEOUS
Qty: 70 ML | Refills: 1 | Status: SHIPPED | OUTPATIENT
Start: 2023-06-21

## 2023-06-26 DIAGNOSIS — E10.9 TYPE 1 DIABETES MELLITUS WITHOUT COMPLICATION (HCC): ICD-10-CM

## 2023-06-27 RX ORDER — INSULIN LISPRO 100 [IU]/ML
INJECTION, SOLUTION INTRAVENOUS; SUBCUTANEOUS
Qty: 30 ML | Refills: 2 | Status: SHIPPED | OUTPATIENT
Start: 2023-06-27

## 2023-07-04 ENCOUNTER — HOSPITAL ENCOUNTER (EMERGENCY)
Age: 33
Discharge: HOME | End: 2023-07-05
Attending: EMERGENCY MEDICINE
Payer: COMMERCIAL

## 2023-07-04 VITALS
DIASTOLIC BLOOD PRESSURE: 93 MMHG | BODY MASS INDEX: 23.62 KG/M2 | WEIGHT: 165 LBS | TEMPERATURE: 98.4 F | SYSTOLIC BLOOD PRESSURE: 135 MMHG | RESPIRATION RATE: 16 BRPM | HEIGHT: 70 IN | HEART RATE: 88 BPM | OXYGEN SATURATION: 97 %

## 2023-07-04 DIAGNOSIS — T23.102A: Primary | ICD-10-CM

## 2023-07-04 DIAGNOSIS — S05.01XA ABRASION OF RIGHT CORNEA, INITIAL ENCOUNTER: ICD-10-CM

## 2023-07-04 PROCEDURE — 96372 THER/PROPH/DIAG INJ SC/IM: CPT

## 2023-07-04 PROCEDURE — 99284 EMERGENCY DEPT VISIT MOD MDM: CPT

## 2023-07-04 ASSESSMENT — PAIN DESCRIPTION - LOCATION: LOCATION: HAND

## 2023-07-04 ASSESSMENT — LIFESTYLE VARIABLES
HOW OFTEN DO YOU HAVE A DRINK CONTAINING ALCOHOL: 4 OR MORE TIMES A WEEK
HOW MANY STANDARD DRINKS CONTAINING ALCOHOL DO YOU HAVE ON A TYPICAL DAY: 3 OR 4

## 2023-07-04 ASSESSMENT — PAIN SCALES - GENERAL: PAINLEVEL_OUTOF10: 8

## 2023-07-04 ASSESSMENT — PAIN DESCRIPTION - ORIENTATION: ORIENTATION: LEFT

## 2023-07-04 ASSESSMENT — PAIN - FUNCTIONAL ASSESSMENT: PAIN_FUNCTIONAL_ASSESSMENT: 0-10

## 2023-07-05 ENCOUNTER — APPOINTMENT (OUTPATIENT)
Dept: GENERAL RADIOLOGY | Age: 33
End: 2023-07-05
Payer: COMMERCIAL

## 2023-07-05 PROCEDURE — 90471 IMMUNIZATION ADMIN: CPT | Performed by: EMERGENCY MEDICINE

## 2023-07-05 PROCEDURE — 6360000002 HC RX W HCPCS: Performed by: EMERGENCY MEDICINE

## 2023-07-05 PROCEDURE — 6370000000 HC RX 637 (ALT 250 FOR IP): Performed by: EMERGENCY MEDICINE

## 2023-07-05 PROCEDURE — 73130 X-RAY EXAM OF HAND: CPT

## 2023-07-05 PROCEDURE — 90714 TD VACC NO PRESV 7 YRS+ IM: CPT | Performed by: EMERGENCY MEDICINE

## 2023-07-05 RX ORDER — HYDROCODONE BITARTRATE AND ACETAMINOPHEN 5; 325 MG/1; MG/1
1 TABLET ORAL EVERY 6 HOURS PRN
Qty: 12 TABLET | Refills: 0 | Status: SHIPPED | OUTPATIENT
Start: 2023-07-05 | End: 2023-07-08

## 2023-07-05 RX ORDER — TETANUS AND DIPHTHERIA TOXOIDS ADSORBED 2; 2 [LF]/.5ML; [LF]/.5ML
0.5 INJECTION INTRAMUSCULAR ONCE
Status: COMPLETED | OUTPATIENT
Start: 2023-07-05 | End: 2023-07-05

## 2023-07-05 RX ORDER — OXYCODONE HYDROCHLORIDE AND ACETAMINOPHEN 5; 325 MG/1; MG/1
2 TABLET ORAL ONCE
Status: COMPLETED | OUTPATIENT
Start: 2023-07-05 | End: 2023-07-05

## 2023-07-05 RX ORDER — ERYTHROMYCIN 5 MG/G
OINTMENT OPHTHALMIC
Qty: 1 G | Refills: 0 | Status: SHIPPED | OUTPATIENT
Start: 2023-07-05 | End: 2023-07-15

## 2023-07-05 RX ADMIN — TETANUS AND DIPHTHERIA TOXOIDS ADSORBED 0.5 ML: 2; 2 INJECTION INTRAMUSCULAR at 00:24

## 2023-07-05 RX ADMIN — OXYCODONE HYDROCHLORIDE AND ACETAMINOPHEN 2 TABLET: 5; 325 TABLET ORAL at 00:23

## 2023-07-05 ASSESSMENT — PAIN DESCRIPTION - ORIENTATION: ORIENTATION: LEFT

## 2023-07-05 ASSESSMENT — PAIN DESCRIPTION - LOCATION: LOCATION: HAND

## 2023-07-05 ASSESSMENT — PAIN SCALES - GENERAL: PAINLEVEL_OUTOF10: 7

## 2023-07-05 NOTE — ED NOTES
Discharge instructions given. Patient verbalizes understanding. No other noted or stated problems at this time. Patient will follow up with primary care.       Hernando Cuevas RN  07/05/23 7627

## 2023-11-27 ENCOUNTER — OFFICE VISIT (OUTPATIENT)
Dept: ENDOCRINOLOGY | Age: 33
End: 2023-11-27
Payer: COMMERCIAL

## 2023-11-27 VITALS
WEIGHT: 154 LBS | HEART RATE: 76 BPM | HEIGHT: 70 IN | BODY MASS INDEX: 22.05 KG/M2 | SYSTOLIC BLOOD PRESSURE: 133 MMHG | DIASTOLIC BLOOD PRESSURE: 70 MMHG | RESPIRATION RATE: 18 BRPM

## 2023-11-27 DIAGNOSIS — E10.9 TYPE 1 DIABETES MELLITUS WITHOUT COMPLICATION (HCC): Primary | ICD-10-CM

## 2023-11-27 DIAGNOSIS — E03.9 PRIMARY HYPOTHYROIDISM: ICD-10-CM

## 2023-11-27 DIAGNOSIS — E03.9 HYPOTHYROIDISM, UNSPECIFIED TYPE: ICD-10-CM

## 2023-11-27 LAB — HBA1C MFR BLD: 10.2 %

## 2023-11-27 PROCEDURE — 4004F PT TOBACCO SCREEN RCVD TLK: CPT | Performed by: FAMILY MEDICINE

## 2023-11-27 PROCEDURE — G8427 DOCREV CUR MEDS BY ELIG CLIN: HCPCS | Performed by: FAMILY MEDICINE

## 2023-11-27 PROCEDURE — G8420 CALC BMI NORM PARAMETERS: HCPCS | Performed by: FAMILY MEDICINE

## 2023-11-27 PROCEDURE — 3046F HEMOGLOBIN A1C LEVEL >9.0%: CPT | Performed by: FAMILY MEDICINE

## 2023-11-27 PROCEDURE — G8484 FLU IMMUNIZE NO ADMIN: HCPCS | Performed by: FAMILY MEDICINE

## 2023-11-27 PROCEDURE — 99214 OFFICE O/P EST MOD 30 MIN: CPT | Performed by: FAMILY MEDICINE

## 2023-11-27 PROCEDURE — 95251 CONT GLUC MNTR ANALYSIS I&R: CPT | Performed by: FAMILY MEDICINE

## 2023-11-27 PROCEDURE — 83036 HEMOGLOBIN GLYCOSYLATED A1C: CPT | Performed by: FAMILY MEDICINE

## 2023-11-27 PROCEDURE — 2022F DILAT RTA XM EVC RTNOPTHY: CPT | Performed by: FAMILY MEDICINE

## 2023-11-27 RX ORDER — INSULIN LISPRO 100 [IU]/ML
INJECTION, SOLUTION INTRAVENOUS; SUBCUTANEOUS
Qty: 30 ML | Refills: 2 | Status: SHIPPED | OUTPATIENT
Start: 2023-11-27

## 2023-11-27 RX ORDER — BLOOD-GLUCOSE SENSOR
EACH MISCELLANEOUS
Qty: 12 EACH | Refills: 3 | Status: SHIPPED | OUTPATIENT
Start: 2023-11-27

## 2023-11-27 RX ORDER — BLOOD SUGAR DIAGNOSTIC
STRIP MISCELLANEOUS
Qty: 250 STRIP | Refills: 3 | Status: SHIPPED | OUTPATIENT
Start: 2023-11-27

## 2023-11-27 RX ORDER — BLOOD-GLUCOSE SENSOR
EACH MISCELLANEOUS
Qty: 6 EACH | Refills: 3 | Status: SHIPPED | OUTPATIENT
Start: 2023-11-27

## 2023-11-27 NOTE — PROGRESS NOTES
[address] [facility] Department of Endocrinology Diabetes and Metabolism   [facility]. [address] Parveen Frankel, [facility]     Phone: [phone]  Fax: [phone]    Date of Service: 11/27/2023    Primary Care Physician: No primary care provider on file. Provider:SIA Schroeder CNP     Reason for the visit:  DM type 1     History of Present Illness: The history is provided by the patient. No  was used. Accuracy of the patient data is excellent. Tracey Steven is a very pleasant 28 y.o. male seen in Endocrine clinic today for diabetes management     Last office visit January 2022. Tracey Steven was diagnosed with diabetes in 8/2019. Currently on Medtronic insulin pump 670G with following settings  Current pump settings: Basal rate 0.6, CR 15, ISF 49, Goal 100-120, 3 hrs     Was on Guardian 3 sensor, however he stopped using it a long time ago. Checking his blood sugar multiple times a day via fingerstick. Most above goal.    TDD 32.7units  Most recent A1c results summarized below  Lab Results   Component Value Date/Time    LABA1C 10.2 11/27/2023 10:49 AM    LABA1C 7.6 01/12/2022 01:50 PM    LABA1C 8.0 02/02/2021 09:23 AM     Patient has had no hypoglycemic episodes  The patient hasn't been mindful of what has been eating and wasn't following diabetes diet as encouraged. I reviewed current medications and the patient has no issues with diabetes medications  Tracey Steven is up to due for eye exam   The patient performs his own feet care  Microvascular complications:  No Retinopathy, Nephropathy or Neuropathy   Macrovascular complications: no CAD, PVD, or Stroke    Primary hypothyroidism   Ordered levothyroxine 25 mcg daily. Patient admits that he is not taking his medication.     Lab Results   Component Value Date/Time    TSH 4.100 10/20/2020 12:25 PM    T4FREE 1.18 10/20/2020 12:25 PM    [id number] 4.6 08/30/2019 06:30 AM       PAST MEDICAL HISTORY   Past Medical

## 2023-12-28 ENCOUNTER — TELEPHONE (OUTPATIENT)
Dept: ENDOCRINOLOGY | Age: 33
End: 2023-12-28

## 2023-12-28 DIAGNOSIS — E10.9 TYPE 1 DIABETES MELLITUS WITHOUT COMPLICATION (HCC): Primary | ICD-10-CM

## 2023-12-28 RX ORDER — BLOOD-GLUCOSE SENSOR
EACH MISCELLANEOUS
Qty: 6 EACH | Refills: 3 | Status: SHIPPED | OUTPATIENT
Start: 2023-12-28

## 2024-02-27 ENCOUNTER — OFFICE VISIT (OUTPATIENT)
Dept: ENDOCRINOLOGY | Age: 34
End: 2024-02-27
Payer: COMMERCIAL

## 2024-02-27 VITALS
WEIGHT: 150 LBS | HEART RATE: 88 BPM | HEIGHT: 70 IN | SYSTOLIC BLOOD PRESSURE: 114 MMHG | DIASTOLIC BLOOD PRESSURE: 83 MMHG | OXYGEN SATURATION: 99 % | BODY MASS INDEX: 21.47 KG/M2 | RESPIRATION RATE: 18 BRPM

## 2024-02-27 DIAGNOSIS — E03.9 PRIMARY HYPOTHYROIDISM: ICD-10-CM

## 2024-02-27 DIAGNOSIS — E10.9 TYPE 1 DIABETES MELLITUS WITHOUT COMPLICATION (HCC): ICD-10-CM

## 2024-02-27 DIAGNOSIS — Z91.119 DIETARY NONCOMPLIANCE: ICD-10-CM

## 2024-02-27 DIAGNOSIS — E10.65 TYPE 1 DIABETES MELLITUS WITH HYPERGLYCEMIA (HCC): Primary | ICD-10-CM

## 2024-02-27 DIAGNOSIS — E55.9 VITAMIN D DEFICIENCY: ICD-10-CM

## 2024-02-27 LAB — HBA1C MFR BLD: 6.5 %

## 2024-02-27 PROCEDURE — 95251 CONT GLUC MNTR ANALYSIS I&R: CPT | Performed by: FAMILY MEDICINE

## 2024-02-27 PROCEDURE — G8484 FLU IMMUNIZE NO ADMIN: HCPCS | Performed by: FAMILY MEDICINE

## 2024-02-27 PROCEDURE — G8420 CALC BMI NORM PARAMETERS: HCPCS | Performed by: FAMILY MEDICINE

## 2024-02-27 PROCEDURE — 4004F PT TOBACCO SCREEN RCVD TLK: CPT | Performed by: FAMILY MEDICINE

## 2024-02-27 PROCEDURE — 3044F HG A1C LEVEL LT 7.0%: CPT | Performed by: FAMILY MEDICINE

## 2024-02-27 PROCEDURE — 99214 OFFICE O/P EST MOD 30 MIN: CPT | Performed by: FAMILY MEDICINE

## 2024-02-27 PROCEDURE — 83036 HEMOGLOBIN GLYCOSYLATED A1C: CPT | Performed by: FAMILY MEDICINE

## 2024-02-27 PROCEDURE — G8427 DOCREV CUR MEDS BY ELIG CLIN: HCPCS | Performed by: FAMILY MEDICINE

## 2024-02-27 PROCEDURE — 2022F DILAT RTA XM EVC RTNOPTHY: CPT | Performed by: FAMILY MEDICINE

## 2024-02-27 RX ORDER — INSULIN LISPRO 100 [IU]/ML
INJECTION, SOLUTION INTRAVENOUS; SUBCUTANEOUS
Qty: 30 ML | Refills: 5 | Status: SHIPPED | OUTPATIENT
Start: 2024-02-27

## 2024-02-27 NOTE — PROGRESS NOTES
[facility]  [facility] Department of Endocrinology Diabetes and Metabolism   [address] [place] [address]     Phone: [phone]  Fax: [phone]    Date of Service: 2/27/2024    Primary Care Physician: No primary care provider on file.  Provider:SIA Emanuel CNP     Reason for the visit:  DM type 1     History of Present Illness:  The history is provided by the patient. No  was used. Accuracy of the patient data is excellent.  Moses Capellan is a very pleasant 33 y.o. male seen in Endocrine clinic today for diabetes management       Moses Capellan was diagnosed with diabetes in 8/2019.     Currently on Medtronic insulin pump 670G with following settings  Current pump settings:  Basal rate 0.7, CR 13, ISF 49, Goal 100-120, 2:30 hrs     Was on Guardian 3 sensor, however he stopped using it a long time ago.  Using pump in manual mode with saumya 3    TIR 75%  Hyperglycemia 23%  Hypoglycemia 2%  Avg glucose 147  GMI 6.8%    TDD 31.6 units     Most recent A1c results summarized below  Lab Results   Component Value Date/Time    LABA1C 10.2 11/27/2023 10:49 AM    LABA1C 7.6 01/12/2022 01:50 PM    LABA1C 8.0 02/02/2021 09:23 AM     Patient has had hypoglycemic episodes, most often between the hours of 4 AM and 8 AM.  The patient hasn't been mindful of what has been eating and wasn't following diabetes diet as encouraged.   I reviewed current medications and the patient has no issues with diabetes medications  Moses Capellan is  due for eye exam   The patient performs his own feet care  Microvascular complications:  No Retinopathy, Nephropathy or Neuropathy   Macrovascular complications: no CAD, PVD, or Stroke    Primary hypothyroidism   Ordered levothyroxine 25 mcg daily. Patient admits that he is not taking his medication.    Lab Results   Component Value Date/Time    TSH 4.100 10/20/2020 12:25 PM    T4FREE 1.18 10/20/2020 12:25 PM    [id number] 4.6 08/30/2019 06:30

## 2024-06-04 ENCOUNTER — HOSPITAL ENCOUNTER (OUTPATIENT)
Age: 34
Discharge: HOME | End: 2024-06-04
Payer: COMMERCIAL

## 2024-06-04 ENCOUNTER — TELEPHONE (OUTPATIENT)
Dept: ENDOCRINOLOGY | Age: 34
End: 2024-06-04

## 2024-06-04 ENCOUNTER — OFFICE VISIT (OUTPATIENT)
Dept: ENDOCRINOLOGY | Age: 34
End: 2024-06-04

## 2024-06-04 VITALS
RESPIRATION RATE: 18 BRPM | OXYGEN SATURATION: 97 % | HEIGHT: 70 IN | HEART RATE: 85 BPM | SYSTOLIC BLOOD PRESSURE: 115 MMHG | DIASTOLIC BLOOD PRESSURE: 78 MMHG | WEIGHT: 158.2 LBS | BODY MASS INDEX: 22.65 KG/M2

## 2024-06-04 DIAGNOSIS — E03.9 PRIMARY HYPOTHYROIDISM: Primary | ICD-10-CM

## 2024-06-04 DIAGNOSIS — E55.9 VITAMIN D DEFICIENCY: ICD-10-CM

## 2024-06-04 DIAGNOSIS — E03.9 PRIMARY HYPOTHYROIDISM: ICD-10-CM

## 2024-06-04 DIAGNOSIS — E10.9 TYPE 1 DIABETES MELLITUS WITHOUT COMPLICATION (HCC): ICD-10-CM

## 2024-06-04 DIAGNOSIS — Z91.119 DIETARY NONCOMPLIANCE: ICD-10-CM

## 2024-06-04 DIAGNOSIS — E10.9 TYPE 1 DIABETES MELLITUS WITHOUT COMPLICATION (HCC): Primary | ICD-10-CM

## 2024-06-04 LAB
25(OH)D3 SERPL-MCNC: 29 NG/ML (ref 30–100)
ALBUMIN SERPL-MCNC: 4.6 G/DL (ref 3.5–5.2)
ALP SERPL-CCNC: 50 U/L (ref 40–129)
ALT SERPL-CCNC: 21 U/L (ref 0–40)
ANION GAP SERPL CALCULATED.3IONS-SCNC: 8 MMOL/L (ref 7–16)
AST SERPL-CCNC: 26 U/L (ref 0–39)
BASOPHILS # BLD: 0.12 K/UL (ref 0–0.2)
BASOPHILS NFR BLD: 2 % (ref 0–2)
BILIRUB SERPL-MCNC: 0.4 MG/DL (ref 0–1.2)
BUN SERPL-MCNC: 12 MG/DL (ref 6–20)
CALCIUM SERPL-MCNC: 9.5 MG/DL (ref 8.6–10.2)
CHLORIDE SERPL-SCNC: 101 MMOL/L (ref 98–107)
CHOLEST SERPL-MCNC: 170 MG/DL
CO2 SERPL-SCNC: 27 MMOL/L (ref 22–29)
CREAT SERPL-MCNC: 1 MG/DL (ref 0.7–1.2)
CREAT UR-MCNC: 110.5 MG/DL (ref 40–278)
EOSINOPHIL # BLD: 0.79 K/UL (ref 0.05–0.5)
EOSINOPHILS RELATIVE PERCENT: 10 % (ref 0–6)
ERYTHROCYTE [DISTWIDTH] IN BLOOD BY AUTOMATED COUNT: 13.4 % (ref 11.5–15)
GFR, ESTIMATED: >90 ML/MIN/1.73M2
GLUCOSE SERPL-MCNC: 78 MG/DL (ref 74–99)
HBA1C MFR BLD: 6 %
HCT VFR BLD AUTO: 45.3 % (ref 37–54)
HDLC SERPL-MCNC: 86 MG/DL
HGB BLD-MCNC: 15.2 G/DL (ref 12.5–16.5)
IMM GRANULOCYTES # BLD AUTO: 0.04 K/UL (ref 0–0.58)
IMM GRANULOCYTES NFR BLD: 1 % (ref 0–5)
LDLC SERPL CALC-MCNC: 75 MG/DL
LYMPHOCYTES NFR BLD: 1.65 K/UL (ref 1.5–4)
LYMPHOCYTES RELATIVE PERCENT: 20 % (ref 20–42)
MCH RBC QN AUTO: 32.3 PG (ref 26–35)
MCHC RBC AUTO-ENTMCNC: 33.6 G/DL (ref 32–34.5)
MCV RBC AUTO: 96.2 FL (ref 80–99.9)
MICROALBUMIN UR-MCNC: <12 MG/L (ref 0–19)
MICROALBUMIN/CREAT UR-RTO: NORMAL MCG/MG CREAT (ref 0–30)
MONOCYTES NFR BLD: 0.97 K/UL (ref 0.1–0.95)
MONOCYTES NFR BLD: 12 % (ref 2–12)
NEUTROPHILS NFR BLD: 56 % (ref 43–80)
NEUTS SEG NFR BLD: 4.54 K/UL (ref 1.8–7.3)
PLATELET # BLD AUTO: 275 K/UL (ref 130–450)
PMV BLD AUTO: 8.6 FL (ref 7–12)
POTASSIUM SERPL-SCNC: 4.3 MMOL/L (ref 3.5–5)
PROT SERPL-MCNC: 7.7 G/DL (ref 6.4–8.3)
RBC # BLD AUTO: 4.71 M/UL (ref 3.8–5.8)
SODIUM SERPL-SCNC: 136 MMOL/L (ref 132–146)
T4 FREE SERPL-MCNC: 1.1 NG/DL (ref 0.9–1.7)
TRIGL SERPL-MCNC: 44 MG/DL
TSH SERPL DL<=0.05 MIU/L-ACNC: 5.54 UIU/ML (ref 0.27–4.2)
VLDLC SERPL CALC-MCNC: 9 MG/DL
WBC OTHER # BLD: 8.1 K/UL (ref 4.5–11.5)

## 2024-06-04 PROCEDURE — 84443 ASSAY THYROID STIM HORMONE: CPT

## 2024-06-04 PROCEDURE — 36415 COLL VENOUS BLD VENIPUNCTURE: CPT

## 2024-06-04 PROCEDURE — 80053 COMPREHEN METABOLIC PANEL: CPT

## 2024-06-04 PROCEDURE — 82306 VITAMIN D 25 HYDROXY: CPT

## 2024-06-04 PROCEDURE — 82570 ASSAY OF URINE CREATININE: CPT

## 2024-06-04 PROCEDURE — 85025 COMPLETE CBC W/AUTO DIFF WBC: CPT

## 2024-06-04 PROCEDURE — 82043 UR ALBUMIN QUANTITATIVE: CPT

## 2024-06-04 PROCEDURE — 84439 ASSAY OF FREE THYROXINE: CPT

## 2024-06-04 PROCEDURE — 80061 LIPID PANEL: CPT

## 2024-06-04 RX ORDER — LEVOTHYROXINE SODIUM 0.03 MG/1
25 TABLET ORAL DAILY
Qty: 90 TABLET | Refills: 3 | Status: SHIPPED | OUTPATIENT
Start: 2024-06-04

## 2024-06-04 NOTE — PROGRESS NOTES
Data:  I personally reviewed the following lab:  Lab Results   Component Value Date/Time    WBC 7.4 08/31/2019 05:20 AM    RBC 4.27 08/31/2019 05:20 AM    HGB 13.8 08/31/2019 05:20 AM    HCT 40.9 08/31/2019 05:20 AM    MCV 95.8 08/31/2019 05:20 AM    MCH 32.3 08/31/2019 05:20 AM    MCHC 33.7 08/31/2019 05:20 AM    RDW 12.6 08/31/2019 05:20 AM     08/31/2019 05:20 AM    MPV 10.2 08/31/2019 05:20 AM      Lab Results   Component Value Date/Time     10/20/2020 12:25 PM    K 4.5 10/20/2020 12:25 PM    CO2 27 10/20/2020 12:25 PM    BUN 10 10/20/2020 12:25 PM    CREATININE 0.9 10/20/2020 12:25 PM    CALCIUM 9.7 10/20/2020 12:25 PM    LABGLOM >60 10/20/2020 12:25 PM    GFRAA >60 10/20/2020 12:25 PM      Lab Results   Component Value Date/Time    TSH 4.100 10/20/2020 12:25 PM    T4FREE 1.18 10/20/2020 12:25 PM    [id number] 4.6 08/30/2019 06:30 AM     Lab Results   Component Value Date/Time    LABA1C 6.5 02/27/2024 01:30 PM    GLUCOSE 303 10/20/2020 12:25 PM    MALBCR 13.1 10/20/2020 12:20 PM     Lab Results   Component Value Date/Time    LABA1C 6.5 02/27/2024 01:30 PM    LABA1C 10.2 11/27/2023 10:49 AM    LABA1C 7.6 01/12/2022 01:50 PM     No results found for: \"CHOL\", \"TRIG\", \"HDL\", \"LDL\"  Lab Results   Component Value Date/Time    VITD25 22 10/20/2020 12:25 PM     ASSESSMENT & RECOMMENDATIONS   Moses ANDUJAR Timi, a 33 y.o.-old male seen in for the following issues     Diabetes Mellitus Type 1     Patient's diabetes has significantly improved.  Hemoglobin A1c 6.5%  Adjust pump settings:  Basal rate 12am 0.6, 8am 0.7, CR 12, ISF 56, Goal 100-120, 2:30 hrs   Discussed with patient A1c and blood sugar goals   Patient will need routine diabetes maintenance and prevention  Diabetes labs were ordered at last visit, however not completed. Reorder diabetes labs.    Continuous Glucose Monitoring (CGM) download and interpretation   I personally reviewed and interpreted continuous glucose monitor (CGM) download. CGM report was

## 2024-06-04 NOTE — TELEPHONE ENCOUNTER
Please let patient know that I reviewed his labs.  His thyroid is underactive.    I recommend resuming levothyroxine 25 mg daily.  New Rx sent to [facility] in [place], [place].    Repeat TFTs in 6 to 8 weeks

## 2024-09-24 ENCOUNTER — OFFICE VISIT (OUTPATIENT)
Dept: ENDOCRINOLOGY | Age: 34
End: 2024-09-24
Payer: COMMERCIAL

## 2024-09-24 VITALS
WEIGHT: 166.6 LBS | HEIGHT: 70 IN | OXYGEN SATURATION: 81 % | HEART RATE: 111 BPM | DIASTOLIC BLOOD PRESSURE: 83 MMHG | BODY MASS INDEX: 23.85 KG/M2 | SYSTOLIC BLOOD PRESSURE: 115 MMHG | RESPIRATION RATE: 18 BRPM

## 2024-09-24 DIAGNOSIS — Z91.119 DIETARY NONCOMPLIANCE: ICD-10-CM

## 2024-09-24 DIAGNOSIS — E10.9 TYPE 1 DIABETES MELLITUS WITHOUT COMPLICATION (HCC): Primary | ICD-10-CM

## 2024-09-24 DIAGNOSIS — E03.9 PRIMARY HYPOTHYROIDISM: ICD-10-CM

## 2024-09-24 LAB — HBA1C MFR BLD: 6.3 %

## 2024-09-24 PROCEDURE — 83036 HEMOGLOBIN GLYCOSYLATED A1C: CPT | Performed by: FAMILY MEDICINE

## 2024-09-24 PROCEDURE — G8420 CALC BMI NORM PARAMETERS: HCPCS | Performed by: FAMILY MEDICINE

## 2024-09-24 PROCEDURE — 3044F HG A1C LEVEL LT 7.0%: CPT | Performed by: FAMILY MEDICINE

## 2024-09-24 PROCEDURE — 95251 CONT GLUC MNTR ANALYSIS I&R: CPT | Performed by: FAMILY MEDICINE

## 2024-09-24 PROCEDURE — 2022F DILAT RTA XM EVC RTNOPTHY: CPT | Performed by: FAMILY MEDICINE

## 2024-09-24 PROCEDURE — 99214 OFFICE O/P EST MOD 30 MIN: CPT | Performed by: FAMILY MEDICINE

## 2024-09-24 PROCEDURE — 4004F PT TOBACCO SCREEN RCVD TLK: CPT | Performed by: FAMILY MEDICINE

## 2024-09-24 PROCEDURE — G8427 DOCREV CUR MEDS BY ELIG CLIN: HCPCS | Performed by: FAMILY MEDICINE

## 2024-09-24 RX ORDER — IBUPROFEN 800 MG/1
800 TABLET, FILM COATED ORAL 2 TIMES DAILY PRN
Qty: 180 TABLET | Refills: 1 | Status: SHIPPED | OUTPATIENT
Start: 2024-09-24

## 2024-09-24 RX ORDER — INSULIN LISPRO 100 [IU]/ML
INJECTION, SOLUTION INTRAVENOUS; SUBCUTANEOUS
Qty: 30 ML | Refills: 11 | Status: SHIPPED | OUTPATIENT
Start: 2024-09-24

## 2024-10-18 NOTE — PATIENT INSTRUCTIONS
Group Topic: BH Check-in/Symptom Rating    Date: 10/18/2024  Start Time: 0930  End Time: 0945  Facilitators: Inge Adams CNA    Focus: ROLY: Goals  Number in attendance: 9    Method: Group  Attendance: Present  Participation: Active  Patient Response: Attentive  Mood: Normal  Affect: Type: Euthymic (normal mood)   Range: Full (normal)   Congruency: Congruent   Stability: Stable  Behavior/Socialization: Appropriate to group  Thought Process: [place] thinking  Task Performance: Follows directions  Patient Evaluation: Independent - full participation     Recommendations for today's visit  · Take Lantus 14 units daily in the morning   · Continue current dose of Lispro and current sliding scale   · Check Blood sugar 4 times/day before meals and at bedtime and send us sugar log in a week or two. You can fax, mail or email your sugar log     Email:  [email or web address]     These are your blood sugar, blood pressure, cholesterol and A1c goals:  · Blood sugar fastin mg/dl to 130 mg/dl  · Blood sugar before meals: <150 mg/dl  · Peak blood sugar lower than 180 mg/dl  · Bad cholesterol (LDL cholesterol): less than 100 mg/dl  · Blood pressure: less than 140/80 mmHg\  · A1c: between 6.5 - 7%      Steps for managing low blood sugar  1. Eat 15 grams of glucose of simple carbohydrate, as found in:   1 tablespoon sugar, Enma or corn syrup    4 oz (1/2 cup) of juice or regular soda   Glucose Tablet or gel (follow package instruction)   2. Wait 15 min and check blood sugar again   3. Repeat until blood sugar within range  4. Once within range, follow up with snack or meal within 1 hour      I you have any questions please call Dr. Darshana Casey office       Dane Ramirez MD  Endocrinologist, [facility])   [address] [place], [place] [id number]   Phone: [phone]  Fax: [phone]  Email: [email or web address]

## 2024-10-31 DIAGNOSIS — E10.9 TYPE 1 DIABETES MELLITUS WITHOUT COMPLICATION (HCC): ICD-10-CM

## 2024-10-31 RX ORDER — INSULIN LISPRO 100 [IU]/ML
INJECTION, SOLUTION INTRAVENOUS; SUBCUTANEOUS
Qty: 69 ML | Refills: 1 | Status: SHIPPED | OUTPATIENT
Start: 2024-10-31

## 2024-11-13 ENCOUNTER — HOSPITAL ENCOUNTER (EMERGENCY)
Age: 34
Discharge: HOME | End: 2024-11-13
Payer: COMMERCIAL

## 2024-11-13 VITALS
BODY MASS INDEX: 22.9 KG/M2 | SYSTOLIC BLOOD PRESSURE: 128 MMHG | DIASTOLIC BLOOD PRESSURE: 82 MMHG | HEART RATE: 86 BPM | RESPIRATION RATE: 15 BRPM | TEMPERATURE: 97.1 F | HEIGHT: 70 IN | WEIGHT: 160 LBS | OXYGEN SATURATION: 100 %

## 2024-11-13 DIAGNOSIS — H60.392 INFECTIVE OTITIS EXTERNA OF LEFT EAR: Primary | ICD-10-CM

## 2024-11-13 DIAGNOSIS — H61.23 BILATERAL IMPACTED CERUMEN: ICD-10-CM

## 2024-11-13 DIAGNOSIS — H92.02 LEFT EAR PAIN: ICD-10-CM

## 2024-11-13 PROCEDURE — 99283 EMERGENCY DEPT VISIT LOW MDM: CPT

## 2024-11-13 PROCEDURE — 69210 REMOVE IMPACTED EAR WAX UNI: CPT

## 2024-11-13 PROCEDURE — 6370000000 HC RX 637 (ALT 250 FOR IP): Performed by: NURSE PRACTITIONER

## 2024-11-13 RX ORDER — OFLOXACIN 3 MG/ML
5 SOLUTION AURICULAR (OTIC) 2 TIMES DAILY
Qty: 1 EACH | Refills: 0 | Status: SHIPPED | OUTPATIENT
Start: 2024-11-13 | End: 2024-11-20

## 2024-11-13 RX ORDER — IBUPROFEN 600 MG/1
600 TABLET, FILM COATED ORAL ONCE
Status: COMPLETED | OUTPATIENT
Start: 2024-11-13 | End: 2024-11-13

## 2024-11-13 RX ORDER — OFLOXACIN 3 MG/ML
5 SOLUTION AURICULAR (OTIC) ONCE
Status: COMPLETED | OUTPATIENT
Start: 2024-11-13 | End: 2024-11-13

## 2024-11-13 RX ADMIN — OFLOXACIN 5 DROP: 3 SOLUTION AURICULAR (OTIC) at 22:06

## 2024-11-13 RX ADMIN — AMOXICILLIN AND CLAVULANATE POTASSIUM 1 TABLET: 875; 125 TABLET, FILM COATED ORAL at 20:43

## 2024-11-13 RX ADMIN — IBUPROFEN 600 MG: 600 TABLET, FILM COATED ORAL at 20:43

## 2024-11-13 ASSESSMENT — LIFESTYLE VARIABLES
HOW OFTEN DO YOU HAVE A DRINK CONTAINING ALCOHOL: 4 OR MORE TIMES A WEEK
HOW MANY STANDARD DRINKS CONTAINING ALCOHOL DO YOU HAVE ON A TYPICAL DAY: 3 OR 4
HOW OFTEN DO YOU HAVE A DRINK CONTAINING ALCOHOL: MONTHLY OR LESS
HOW MANY STANDARD DRINKS CONTAINING ALCOHOL DO YOU HAVE ON A TYPICAL DAY: 5 OR 6

## 2024-11-13 ASSESSMENT — PAIN DESCRIPTION - LOCATION: LOCATION: EAR

## 2024-11-13 ASSESSMENT — PAIN DESCRIPTION - ORIENTATION: ORIENTATION: LEFT

## 2024-11-13 ASSESSMENT — PAIN - FUNCTIONAL ASSESSMENT: PAIN_FUNCTIONAL_ASSESSMENT: 0-10

## 2024-11-13 ASSESSMENT — PAIN SCALES - GENERAL: PAINLEVEL_OUTOF10: 8

## 2024-11-14 ENCOUNTER — HOSPITAL ENCOUNTER (EMERGENCY)
Age: 34
Discharge: HOME | End: 2024-11-14
Payer: COMMERCIAL

## 2024-11-14 VITALS
TEMPERATURE: 98.1 F | RESPIRATION RATE: 16 BRPM | HEART RATE: 89 BPM | OXYGEN SATURATION: 97 % | WEIGHT: 160 LBS | SYSTOLIC BLOOD PRESSURE: 150 MMHG | HEIGHT: 70 IN | BODY MASS INDEX: 22.9 KG/M2 | DIASTOLIC BLOOD PRESSURE: 90 MMHG

## 2024-11-14 DIAGNOSIS — H61.23 BILATERAL IMPACTED CERUMEN: ICD-10-CM

## 2024-11-14 DIAGNOSIS — H92.03 OTALGIA OF BOTH EARS: Primary | ICD-10-CM

## 2024-11-14 PROCEDURE — 99283 EMERGENCY DEPT VISIT LOW MDM: CPT

## 2024-11-14 PROCEDURE — 69210 REMOVE IMPACTED EAR WAX UNI: CPT

## 2024-11-14 RX ORDER — IBUPROFEN 600 MG/1
600 TABLET, FILM COATED ORAL 3 TIMES DAILY PRN
Qty: 30 TABLET | Refills: 0 | Status: SHIPPED | OUTPATIENT
Start: 2024-11-14

## 2024-11-14 RX ORDER — HYDROCODONE BITARTRATE AND ACETAMINOPHEN 5; 325 MG/1; MG/1
1 TABLET ORAL EVERY 6 HOURS PRN
Qty: 6 TABLET | Refills: 0 | Status: SHIPPED | OUTPATIENT
Start: 2024-11-14 | End: 2024-11-17

## 2024-11-14 ASSESSMENT — PAIN DESCRIPTION - DESCRIPTORS: DESCRIPTORS: DISCOMFORT

## 2024-11-14 ASSESSMENT — PAIN DESCRIPTION - ORIENTATION: ORIENTATION: LEFT

## 2024-11-14 ASSESSMENT — PAIN SCALES - GENERAL: PAINLEVEL_OUTOF10: 7

## 2024-11-14 ASSESSMENT — PAIN DESCRIPTION - LOCATION: LOCATION: EAR

## 2024-11-14 ASSESSMENT — PAIN - FUNCTIONAL ASSESSMENT: PAIN_FUNCTIONAL_ASSESSMENT: 0-10

## 2024-11-14 NOTE — ED NOTES
Discharge instructions discussed and reviewed with the patient.  He verbalized understanding and has no questions or concerns at time of discharge. Patient given remainder of the oflozacin.

## 2024-11-14 NOTE — ED NOTES
Adam ear irrigated with NS, small amounts of wax were discharged but large amount still in ear. Pt asked to stop at this time as ear is becoming painful to treatment.

## 2024-11-14 NOTE — ED PROVIDER NOTES
advised    Plan of Care/Counseling:  Lauren Chambers PA-C reviewed today's visit with the patient in addition to providing specific details for the plan of care and counseling regarding the diagnosis and prognosis.  Questions are answered at this time and are agreeable with the plan.    ASSESSMENT     1. Otalgia of both ears    2. Bilateral impacted cerumen        DISPOSITION   Discharged home.  Patient condition is stable    Discharge Instructions:   Patient referred to  Jaime Moore, DO  [address] [id number]  [phone]    Schedule an appointment as soon as possible for a visit   As needed    NEW MEDICATIONS     New Prescriptions    HYDROCODONE-ACETAMINOPHEN (NORCO) 5-325 MG PER TABLET    Take 1 tablet by mouth every 6 hours as needed for Pain for up to 3 days. Intended supply: 3 days. Take lowest dose possible to manage pain Max Daily Amount: 4 tablets    IBUPROFEN (ADVIL;MOTRIN) 600 MG TABLET    Take 1 tablet by mouth 3 times daily as needed for Pain     Electronically signed by Lauren Chambers PA-C   DD: 11/14/24  **This report was transcribed using voice recognition software. Every effort was made to ensure accuracy; however, inadvertent computerized transcription errors may be present.  END OF ED PROVIDER NOTE       Lauren Chambers PA-C  11/14/24 1004

## 2025-08-04 DIAGNOSIS — E03.9 PRIMARY HYPOTHYROIDISM: ICD-10-CM

## 2025-08-04 DIAGNOSIS — E10.9 TYPE 1 DIABETES MELLITUS WITHOUT COMPLICATION (HCC): ICD-10-CM

## 2025-08-04 RX ORDER — LEVOTHYROXINE SODIUM 25 UG/1
25 TABLET ORAL DAILY
Qty: 90 TABLET | Refills: 3 | Status: SHIPPED | OUTPATIENT
Start: 2025-08-04